# Patient Record
Sex: MALE | Race: WHITE | NOT HISPANIC OR LATINO | URBAN - METROPOLITAN AREA
[De-identification: names, ages, dates, MRNs, and addresses within clinical notes are randomized per-mention and may not be internally consistent; named-entity substitution may affect disease eponyms.]

---

## 2017-01-30 ENCOUNTER — OFFICE VISIT - HEALTHEAST (OUTPATIENT)
Dept: GERIATRICS | Facility: CLINIC | Age: 82
End: 2017-01-30

## 2017-01-30 DIAGNOSIS — I25.10 CORONARY ATHEROSCLEROSIS: ICD-10-CM

## 2017-01-30 DIAGNOSIS — I10 ESSENTIAL HYPERTENSION WITH GOAL BLOOD PRESSURE LESS THAN 140/90: ICD-10-CM

## 2017-01-30 DIAGNOSIS — F41.8 DEPRESSION WITH ANXIETY: ICD-10-CM

## 2017-01-30 DIAGNOSIS — K21.9 GASTROESOPHAGEAL REFLUX DISEASE WITHOUT ESOPHAGITIS: ICD-10-CM

## 2017-01-30 DIAGNOSIS — F02.80 DEMENTIA OF THE ALZHEIMER'S TYPE (H): ICD-10-CM

## 2017-01-30 DIAGNOSIS — R60.0 BILATERAL LOWER EXTREMITY EDEMA: ICD-10-CM

## 2017-01-30 DIAGNOSIS — G30.9 DEMENTIA OF THE ALZHEIMER'S TYPE (H): ICD-10-CM

## 2017-02-02 ENCOUNTER — AMBULATORY - HEALTHEAST (OUTPATIENT)
Dept: GERIATRICS | Facility: CLINIC | Age: 82
End: 2017-02-02

## 2017-02-23 ENCOUNTER — OFFICE VISIT - HEALTHEAST (OUTPATIENT)
Dept: GERIATRICS | Facility: CLINIC | Age: 82
End: 2017-02-23

## 2017-02-23 DIAGNOSIS — F06.30 MOOD DISORDER IN CONDITIONS CLASSIFIED ELSEWHERE: ICD-10-CM

## 2017-02-23 DIAGNOSIS — I10 ESSENTIAL HYPERTENSION WITH GOAL BLOOD PRESSURE LESS THAN 140/90: ICD-10-CM

## 2017-02-23 DIAGNOSIS — F41.8 DEPRESSION WITH ANXIETY: ICD-10-CM

## 2017-02-23 DIAGNOSIS — F03.918 DEMENTIA WITH BEHAVIORAL DISTURBANCE (H): ICD-10-CM

## 2017-03-04 RX ORDER — DEXTROMETHORPHAN HBR. AND GUAIFENESIN 10; 100 MG/5ML; MG/5ML
10 SOLUTION ORAL EVERY 4 HOURS
Status: SHIPPED | COMMUNITY
Start: 2017-03-04

## 2017-03-04 RX ORDER — MENTHOL AND ZINC OXIDE .44; 20.625 G/100G; G/100G
1 OINTMENT TOPICAL 3 TIMES DAILY PRN
Status: SHIPPED | COMMUNITY
Start: 2017-03-04

## 2017-03-04 RX ORDER — MENTHOL AND ZINC OXIDE .44; 20.625 G/100G; G/100G
1 OINTMENT TOPICAL 3 TIMES DAILY
Status: SHIPPED | COMMUNITY
Start: 2017-03-04

## 2017-03-13 ENCOUNTER — OFFICE VISIT - HEALTHEAST (OUTPATIENT)
Dept: GERIATRICS | Facility: CLINIC | Age: 82
End: 2017-03-13

## 2017-03-13 DIAGNOSIS — F41.8 DEPRESSION WITH ANXIETY: ICD-10-CM

## 2017-03-13 DIAGNOSIS — M62.81 MUSCLE WEAKNESS (GENERALIZED): ICD-10-CM

## 2017-03-13 DIAGNOSIS — E88.09 HYPOALBUMINEMIA DUE TO PROTEIN-CALORIE MALNUTRITION (H): ICD-10-CM

## 2017-03-13 DIAGNOSIS — E46 HYPOALBUMINEMIA DUE TO PROTEIN-CALORIE MALNUTRITION (H): ICD-10-CM

## 2017-03-13 DIAGNOSIS — R21 RASH: ICD-10-CM

## 2017-03-13 DIAGNOSIS — G30.9 DEMENTIA OF THE ALZHEIMER'S TYPE (H): ICD-10-CM

## 2017-03-13 DIAGNOSIS — T14.8XXA DEEP TISSUE INJURY: ICD-10-CM

## 2017-03-13 DIAGNOSIS — R29.6 FREQUENT FALLS: ICD-10-CM

## 2017-03-13 DIAGNOSIS — F02.80 DEMENTIA OF THE ALZHEIMER'S TYPE (H): ICD-10-CM

## 2017-03-17 ENCOUNTER — COMMUNICATION - HEALTHEAST (OUTPATIENT)
Dept: INTERNAL MEDICINE | Facility: CLINIC | Age: 82
End: 2017-03-17

## 2017-03-17 DIAGNOSIS — G30.9 DEMENTIA OF THE ALZHEIMER'S TYPE (H): ICD-10-CM

## 2017-03-17 DIAGNOSIS — F02.80 DEMENTIA OF THE ALZHEIMER'S TYPE (H): ICD-10-CM

## 2017-03-19 ENCOUNTER — AMBULATORY - HEALTHEAST (OUTPATIENT)
Dept: GERIATRICS | Facility: CLINIC | Age: 82
End: 2017-03-19

## 2017-03-20 ENCOUNTER — COMMUNICATION - HEALTHEAST (OUTPATIENT)
Dept: INTERNAL MEDICINE | Facility: CLINIC | Age: 82
End: 2017-03-20

## 2017-03-20 DIAGNOSIS — F02.80 DEMENTIA IN ALZHEIMER'S DISEASE (H): ICD-10-CM

## 2017-03-20 DIAGNOSIS — F02.80 DEMENTIA OF THE ALZHEIMER'S TYPE (H): ICD-10-CM

## 2017-03-20 DIAGNOSIS — G30.9 DEMENTIA OF THE ALZHEIMER'S TYPE (H): ICD-10-CM

## 2017-03-20 DIAGNOSIS — G30.9 DEMENTIA IN ALZHEIMER'S DISEASE (H): ICD-10-CM

## 2017-03-24 ENCOUNTER — COMMUNICATION - HEALTHEAST (OUTPATIENT)
Dept: INTERNAL MEDICINE | Facility: CLINIC | Age: 82
End: 2017-03-24

## 2017-03-24 DIAGNOSIS — G30.9 DEMENTIA IN ALZHEIMER'S DISEASE (H): ICD-10-CM

## 2017-03-24 DIAGNOSIS — F02.80 DEMENTIA IN ALZHEIMER'S DISEASE (H): ICD-10-CM

## 2017-04-20 ENCOUNTER — OFFICE VISIT - HEALTHEAST (OUTPATIENT)
Dept: GERIATRICS | Facility: CLINIC | Age: 82
End: 2017-04-20

## 2017-04-20 DIAGNOSIS — E88.09 HYPOALBUMINEMIA DUE TO PROTEIN-CALORIE MALNUTRITION (H): ICD-10-CM

## 2017-04-20 DIAGNOSIS — G30.9 DEMENTIA OF THE ALZHEIMER'S TYPE (H): ICD-10-CM

## 2017-04-20 DIAGNOSIS — F02.80 DEMENTIA OF THE ALZHEIMER'S TYPE (H): ICD-10-CM

## 2017-04-20 DIAGNOSIS — F41.8 DEPRESSION WITH ANXIETY: ICD-10-CM

## 2017-04-20 DIAGNOSIS — I25.10 CORONARY ATHEROSCLEROSIS: ICD-10-CM

## 2017-04-20 DIAGNOSIS — F06.30 MOOD DISORDER IN CONDITIONS CLASSIFIED ELSEWHERE: ICD-10-CM

## 2017-04-20 DIAGNOSIS — E46 HYPOALBUMINEMIA DUE TO PROTEIN-CALORIE MALNUTRITION (H): ICD-10-CM

## 2017-04-20 DIAGNOSIS — J11.1 INFLUENZA: ICD-10-CM

## 2017-04-20 DIAGNOSIS — I10 ESSENTIAL HYPERTENSION WITH GOAL BLOOD PRESSURE LESS THAN 140/90: ICD-10-CM

## 2017-05-11 ENCOUNTER — OFFICE VISIT - HEALTHEAST (OUTPATIENT)
Dept: GERIATRICS | Facility: CLINIC | Age: 82
End: 2017-05-11

## 2017-05-11 DIAGNOSIS — R21 RASH: ICD-10-CM

## 2017-05-11 DIAGNOSIS — F41.8 DEPRESSION WITH ANXIETY: ICD-10-CM

## 2017-05-11 DIAGNOSIS — M62.81 MUSCLE WEAKNESS (GENERALIZED): ICD-10-CM

## 2017-05-11 DIAGNOSIS — G30.9 DEMENTIA OF THE ALZHEIMER'S TYPE (H): ICD-10-CM

## 2017-05-11 DIAGNOSIS — I10 ESSENTIAL HYPERTENSION WITH GOAL BLOOD PRESSURE LESS THAN 140/90: ICD-10-CM

## 2017-05-11 DIAGNOSIS — F02.80 DEMENTIA OF THE ALZHEIMER'S TYPE (H): ICD-10-CM

## 2017-06-15 ENCOUNTER — OFFICE VISIT - HEALTHEAST (OUTPATIENT)
Dept: GERIATRICS | Facility: CLINIC | Age: 82
End: 2017-06-15

## 2017-06-15 DIAGNOSIS — K21.9 GASTROESOPHAGEAL REFLUX DISEASE WITHOUT ESOPHAGITIS: ICD-10-CM

## 2017-06-15 DIAGNOSIS — R60.0 BILATERAL LOWER EXTREMITY EDEMA: ICD-10-CM

## 2017-06-15 DIAGNOSIS — E78.5 HYPERLIPIDEMIA: ICD-10-CM

## 2017-06-15 DIAGNOSIS — R29.6 FREQUENT FALLS: ICD-10-CM

## 2017-06-15 DIAGNOSIS — I10 ESSENTIAL HYPERTENSION WITH GOAL BLOOD PRESSURE LESS THAN 130/85: ICD-10-CM

## 2017-06-15 DIAGNOSIS — M62.81 MUSCLE WEAKNESS (GENERALIZED): ICD-10-CM

## 2017-07-14 ENCOUNTER — OFFICE VISIT - HEALTHEAST (OUTPATIENT)
Dept: GERIATRICS | Facility: CLINIC | Age: 82
End: 2017-07-14

## 2017-07-14 DIAGNOSIS — M62.81 MUSCLE WEAKNESS (GENERALIZED): ICD-10-CM

## 2017-07-14 DIAGNOSIS — R60.0 BILATERAL LOWER EXTREMITY EDEMA: ICD-10-CM

## 2017-07-14 DIAGNOSIS — I10 ESSENTIAL HYPERTENSION WITH GOAL BLOOD PRESSURE LESS THAN 130/85: ICD-10-CM

## 2017-07-14 DIAGNOSIS — I25.10 CORONARY ATHEROSCLEROSIS: ICD-10-CM

## 2017-07-14 DIAGNOSIS — G30.9 DEMENTIA OF THE ALZHEIMER'S TYPE (H): ICD-10-CM

## 2017-07-14 DIAGNOSIS — F02.80 DEMENTIA OF THE ALZHEIMER'S TYPE (H): ICD-10-CM

## 2017-07-14 DIAGNOSIS — K21.9 GASTROESOPHAGEAL REFLUX DISEASE WITHOUT ESOPHAGITIS: ICD-10-CM

## 2017-07-14 DIAGNOSIS — F41.8 DEPRESSION WITH ANXIETY: ICD-10-CM

## 2017-08-24 ENCOUNTER — OFFICE VISIT - HEALTHEAST (OUTPATIENT)
Dept: GERIATRICS | Facility: CLINIC | Age: 82
End: 2017-08-24

## 2017-08-24 DIAGNOSIS — E78.5 HYPERLIPIDEMIA: ICD-10-CM

## 2017-08-24 DIAGNOSIS — I25.10 CORONARY ATHEROSCLEROSIS: ICD-10-CM

## 2017-08-24 DIAGNOSIS — F03.918 DEMENTIA WITH BEHAVIORAL DISTURBANCE (H): ICD-10-CM

## 2017-08-24 DIAGNOSIS — I10 ESSENTIAL HYPERTENSION WITH GOAL BLOOD PRESSURE LESS THAN 130/85: ICD-10-CM

## 2017-08-24 DIAGNOSIS — R60.0 BILATERAL LOWER EXTREMITY EDEMA: ICD-10-CM

## 2017-09-28 ENCOUNTER — OFFICE VISIT - HEALTHEAST (OUTPATIENT)
Dept: GERIATRICS | Facility: CLINIC | Age: 82
End: 2017-09-28

## 2017-09-28 DIAGNOSIS — I25.10 CORONARY ATHEROSCLEROSIS: ICD-10-CM

## 2017-09-28 DIAGNOSIS — F03.918 DEMENTIA WITH BEHAVIORAL DISTURBANCE (H): ICD-10-CM

## 2017-09-28 DIAGNOSIS — F41.8 DEPRESSION WITH ANXIETY: ICD-10-CM

## 2017-09-28 DIAGNOSIS — R60.0 BILATERAL LOWER EXTREMITY EDEMA: ICD-10-CM

## 2017-09-28 DIAGNOSIS — M62.81 MUSCLE WEAKNESS (GENERALIZED): ICD-10-CM

## 2017-10-31 ENCOUNTER — OFFICE VISIT - HEALTHEAST (OUTPATIENT)
Dept: GERIATRICS | Facility: CLINIC | Age: 82
End: 2017-10-31

## 2017-10-31 DIAGNOSIS — I25.10 CORONARY ATHEROSCLEROSIS: ICD-10-CM

## 2017-10-31 DIAGNOSIS — F02.80 DEMENTIA OF THE ALZHEIMER'S TYPE (H): ICD-10-CM

## 2017-10-31 DIAGNOSIS — F41.8 DEPRESSION WITH ANXIETY: ICD-10-CM

## 2017-10-31 DIAGNOSIS — I10 ESSENTIAL HYPERTENSION WITH GOAL BLOOD PRESSURE LESS THAN 130/85: ICD-10-CM

## 2017-10-31 DIAGNOSIS — G30.9 DEMENTIA OF THE ALZHEIMER'S TYPE (H): ICD-10-CM

## 2017-11-04 ENCOUNTER — COMMUNICATION - HEALTHEAST (OUTPATIENT)
Dept: INTERNAL MEDICINE | Facility: CLINIC | Age: 82
End: 2017-11-04

## 2017-11-04 DIAGNOSIS — F32.9 MAJOR DEPRESSION, SINGLE EPISODE: ICD-10-CM

## 2017-11-13 ENCOUNTER — OFFICE VISIT - HEALTHEAST (OUTPATIENT)
Dept: GERIATRICS | Facility: CLINIC | Age: 82
End: 2017-11-13

## 2017-11-13 DIAGNOSIS — M62.81 MUSCLE WEAKNESS (GENERALIZED): ICD-10-CM

## 2017-11-13 DIAGNOSIS — R60.0 BILATERAL LOWER EXTREMITY EDEMA: ICD-10-CM

## 2017-11-13 DIAGNOSIS — E78.5 HYPERLIPIDEMIA: ICD-10-CM

## 2017-11-13 DIAGNOSIS — K21.9 GASTROESOPHAGEAL REFLUX DISEASE WITHOUT ESOPHAGITIS: ICD-10-CM

## 2017-11-13 DIAGNOSIS — G30.9 DEMENTIA OF THE ALZHEIMER'S TYPE (H): ICD-10-CM

## 2017-11-13 DIAGNOSIS — F02.80 DEMENTIA OF THE ALZHEIMER'S TYPE (H): ICD-10-CM

## 2017-12-18 ENCOUNTER — OFFICE VISIT - HEALTHEAST (OUTPATIENT)
Dept: GERIATRICS | Facility: CLINIC | Age: 82
End: 2017-12-18

## 2017-12-18 DIAGNOSIS — E78.5 HYPERLIPIDEMIA: ICD-10-CM

## 2017-12-18 DIAGNOSIS — F41.8 DEPRESSION WITH ANXIETY: ICD-10-CM

## 2017-12-18 DIAGNOSIS — G30.9 DEMENTIA OF THE ALZHEIMER'S TYPE (H): ICD-10-CM

## 2017-12-18 DIAGNOSIS — I25.10 CORONARY ATHEROSCLEROSIS: ICD-10-CM

## 2017-12-18 DIAGNOSIS — F02.80 DEMENTIA OF THE ALZHEIMER'S TYPE (H): ICD-10-CM

## 2017-12-18 DIAGNOSIS — M62.81 MUSCLE WEAKNESS (GENERALIZED): ICD-10-CM

## 2017-12-18 DIAGNOSIS — R60.0 BILATERAL LOWER EXTREMITY EDEMA: ICD-10-CM

## 2017-12-21 ENCOUNTER — OFFICE VISIT - HEALTHEAST (OUTPATIENT)
Dept: GERIATRICS | Facility: CLINIC | Age: 82
End: 2017-12-21

## 2017-12-21 DIAGNOSIS — J06.9 UPPER RESPIRATORY TRACT INFECTION, UNSPECIFIED TYPE: ICD-10-CM

## 2017-12-21 DIAGNOSIS — F02.80 ALZHEIMER'S DEMENTIA WITHOUT BEHAVIORAL DISTURBANCE, UNSPECIFIED TIMING OF DEMENTIA ONSET: ICD-10-CM

## 2017-12-21 DIAGNOSIS — M62.81 MUSCLE WEAKNESS (GENERALIZED): ICD-10-CM

## 2017-12-21 DIAGNOSIS — G30.9 ALZHEIMER'S DEMENTIA WITHOUT BEHAVIORAL DISTURBANCE, UNSPECIFIED TIMING OF DEMENTIA ONSET: ICD-10-CM

## 2018-01-29 ENCOUNTER — OFFICE VISIT - HEALTHEAST (OUTPATIENT)
Dept: GERIATRICS | Facility: CLINIC | Age: 83
End: 2018-01-29

## 2018-01-29 DIAGNOSIS — R13.10 DYSPHAGIA: ICD-10-CM

## 2018-01-29 DIAGNOSIS — F41.8 DEPRESSION WITH ANXIETY: ICD-10-CM

## 2018-01-29 DIAGNOSIS — F02.80 ALZHEIMER'S DEMENTIA WITHOUT BEHAVIORAL DISTURBANCE, UNSPECIFIED TIMING OF DEMENTIA ONSET: ICD-10-CM

## 2018-01-29 DIAGNOSIS — I25.10 CORONARY ATHEROSCLEROSIS: ICD-10-CM

## 2018-01-29 DIAGNOSIS — G30.9 ALZHEIMER'S DEMENTIA WITHOUT BEHAVIORAL DISTURBANCE, UNSPECIFIED TIMING OF DEMENTIA ONSET: ICD-10-CM

## 2018-01-29 DIAGNOSIS — M62.81 MUSCLE WEAKNESS (GENERALIZED): ICD-10-CM

## 2018-02-07 ENCOUNTER — COMMUNICATION - HEALTHEAST (OUTPATIENT)
Dept: INTERNAL MEDICINE | Facility: CLINIC | Age: 83
End: 2018-02-07

## 2018-02-07 DIAGNOSIS — F32.9 MAJOR DEPRESSION, SINGLE EPISODE: ICD-10-CM

## 2018-02-21 ENCOUNTER — OFFICE VISIT - HEALTHEAST (OUTPATIENT)
Dept: GERIATRICS | Facility: CLINIC | Age: 83
End: 2018-02-21

## 2018-02-21 DIAGNOSIS — I10 ESSENTIAL HYPERTENSION WITH GOAL BLOOD PRESSURE LESS THAN 130/85: ICD-10-CM

## 2018-02-21 DIAGNOSIS — L89.159 DECUBITUS ULCER OF COCCYX, UNSPECIFIED PRESSURE ULCER STAGE: ICD-10-CM

## 2018-02-21 DIAGNOSIS — F03.918 DEMENTIA WITH BEHAVIORAL DISTURBANCE (H): ICD-10-CM

## 2018-02-21 DIAGNOSIS — R60.0 BILATERAL LOWER EXTREMITY EDEMA: ICD-10-CM

## 2018-02-21 DIAGNOSIS — M62.81 MUSCLE WEAKNESS (GENERALIZED): ICD-10-CM

## 2018-03-08 ENCOUNTER — OFFICE VISIT - HEALTHEAST (OUTPATIENT)
Dept: GERIATRICS | Facility: CLINIC | Age: 83
End: 2018-03-08

## 2018-03-08 DIAGNOSIS — F03.918 DEMENTIA WITH BEHAVIORAL DISTURBANCE (H): ICD-10-CM

## 2018-04-24 ENCOUNTER — OFFICE VISIT - HEALTHEAST (OUTPATIENT)
Dept: GERIATRICS | Facility: CLINIC | Age: 83
End: 2018-04-24

## 2018-04-24 DIAGNOSIS — M62.81 MUSCLE WEAKNESS (GENERALIZED): ICD-10-CM

## 2018-04-24 DIAGNOSIS — I25.10 CORONARY ATHEROSCLEROSIS: ICD-10-CM

## 2018-04-24 DIAGNOSIS — I10 ESSENTIAL HYPERTENSION WITH GOAL BLOOD PRESSURE LESS THAN 140/90: ICD-10-CM

## 2018-04-24 DIAGNOSIS — F03.918 DEMENTIA WITH BEHAVIORAL DISTURBANCE (H): ICD-10-CM

## 2018-04-24 DIAGNOSIS — F41.8 DEPRESSION WITH ANXIETY: ICD-10-CM

## 2018-05-03 ENCOUNTER — OFFICE VISIT - HEALTHEAST (OUTPATIENT)
Dept: GERIATRICS | Facility: CLINIC | Age: 83
End: 2018-05-03

## 2018-05-03 DIAGNOSIS — G30.1 LATE ONSET ALZHEIMER'S DISEASE WITH BEHAVIORAL DISTURBANCE (H): ICD-10-CM

## 2018-05-03 DIAGNOSIS — L03.90 CELLULITIS: ICD-10-CM

## 2018-05-03 DIAGNOSIS — F02.818 LATE ONSET ALZHEIMER'S DISEASE WITH BEHAVIORAL DISTURBANCE (H): ICD-10-CM

## 2018-05-04 ENCOUNTER — COMMUNICATION - HEALTHEAST (OUTPATIENT)
Dept: GERIATRICS | Facility: CLINIC | Age: 83
End: 2018-05-04

## 2018-05-04 ENCOUNTER — AMBULATORY - HEALTHEAST (OUTPATIENT)
Dept: GERIATRICS | Facility: CLINIC | Age: 83
End: 2018-05-04

## 2018-05-04 DIAGNOSIS — F03.918 DEMENTIA WITH BEHAVIORAL DISTURBANCE (H): ICD-10-CM

## 2018-05-04 RX ORDER — RISPERIDONE 0.25 MG/1
0.25 TABLET ORAL 2 TIMES DAILY
Status: SHIPPED | COMMUNITY
Start: 2018-05-04

## 2018-05-07 ENCOUNTER — COMMUNICATION - HEALTHEAST (OUTPATIENT)
Dept: GERIATRICS | Facility: CLINIC | Age: 83
End: 2018-05-07

## 2018-05-07 DIAGNOSIS — F32.A DEPRESSION: ICD-10-CM

## 2018-05-07 RX ORDER — MIRTAZAPINE 15 MG/1
15 TABLET, FILM COATED ORAL AT BEDTIME
Qty: 90 TABLET | Refills: 2 | Status: SHIPPED | OUTPATIENT
Start: 2018-05-07

## 2018-05-10 ENCOUNTER — OFFICE VISIT - HEALTHEAST (OUTPATIENT)
Dept: GERIATRICS | Facility: CLINIC | Age: 83
End: 2018-05-10

## 2018-05-10 DIAGNOSIS — F02.818 LATE ONSET ALZHEIMER'S DISEASE WITH BEHAVIORAL DISTURBANCE (H): ICD-10-CM

## 2018-05-10 DIAGNOSIS — L03.115 CELLULITIS OF RIGHT LOWER EXTREMITY: ICD-10-CM

## 2018-05-10 DIAGNOSIS — G30.1 LATE ONSET ALZHEIMER'S DISEASE WITH BEHAVIORAL DISTURBANCE (H): ICD-10-CM

## 2018-05-29 ENCOUNTER — OFFICE VISIT - HEALTHEAST (OUTPATIENT)
Dept: GERIATRICS | Facility: CLINIC | Age: 83
End: 2018-05-29

## 2018-05-29 DIAGNOSIS — I25.10 CORONARY ATHEROSCLEROSIS: ICD-10-CM

## 2018-05-29 DIAGNOSIS — F41.8 DEPRESSION WITH ANXIETY: ICD-10-CM

## 2018-05-29 DIAGNOSIS — F03.918 DEMENTIA WITH BEHAVIORAL DISTURBANCE (H): ICD-10-CM

## 2018-05-29 DIAGNOSIS — M62.81 MUSCLE WEAKNESS (GENERALIZED): ICD-10-CM

## 2018-05-29 DIAGNOSIS — R53.83 FATIGUE: ICD-10-CM

## 2018-06-26 ENCOUNTER — OFFICE VISIT - HEALTHEAST (OUTPATIENT)
Dept: GERIATRICS | Facility: CLINIC | Age: 83
End: 2018-06-26

## 2018-06-26 DIAGNOSIS — G30.9 ALZHEIMER'S DEMENTIA WITHOUT BEHAVIORAL DISTURBANCE, UNSPECIFIED TIMING OF DEMENTIA ONSET: ICD-10-CM

## 2018-06-26 DIAGNOSIS — F02.80 ALZHEIMER'S DEMENTIA WITHOUT BEHAVIORAL DISTURBANCE, UNSPECIFIED TIMING OF DEMENTIA ONSET: ICD-10-CM

## 2018-06-26 DIAGNOSIS — M62.81 MUSCLE WEAKNESS (GENERALIZED): ICD-10-CM

## 2018-06-26 DIAGNOSIS — F41.8 DEPRESSION WITH ANXIETY: ICD-10-CM

## 2018-06-26 DIAGNOSIS — I10 ESSENTIAL HYPERTENSION WITH GOAL BLOOD PRESSURE LESS THAN 140/90: ICD-10-CM

## 2018-06-26 DIAGNOSIS — L89.612 DECUBITUS ULCER OF RIGHT HEEL, STAGE 2 (H): ICD-10-CM

## 2018-06-28 ENCOUNTER — OFFICE VISIT - HEALTHEAST (OUTPATIENT)
Dept: GERIATRICS | Facility: CLINIC | Age: 83
End: 2018-06-28

## 2018-06-28 DIAGNOSIS — F03.91 DEMENTIA WITH BEHAVIORAL DISTURBANCE, UNSPECIFIED DEMENTIA TYPE: ICD-10-CM

## 2018-06-28 RX ORDER — NUT.TX.IMPAIRED DIGEST/FIBER 14.8 G-472
1 POWDER (GRAM) ORAL 2 TIMES DAILY
Status: SHIPPED | COMMUNITY
Start: 2018-06-28

## 2018-06-29 ENCOUNTER — AMBULATORY - HEALTHEAST (OUTPATIENT)
Dept: GERIATRICS | Facility: CLINIC | Age: 83
End: 2018-06-29

## 2018-06-29 RX ORDER — FUROSEMIDE 40 MG
40 TABLET ORAL DAILY
Status: SHIPPED | COMMUNITY
Start: 2018-06-29

## 2018-07-02 ENCOUNTER — RECORDS - HEALTHEAST (OUTPATIENT)
Dept: LAB | Facility: CLINIC | Age: 83
End: 2018-07-02

## 2018-07-02 ENCOUNTER — OFFICE VISIT - HEALTHEAST (OUTPATIENT)
Dept: GERIATRICS | Facility: CLINIC | Age: 83
End: 2018-07-02

## 2018-07-02 DIAGNOSIS — F03.91 DEMENTIA WITH BEHAVIORAL DISTURBANCE, UNSPECIFIED DEMENTIA TYPE: ICD-10-CM

## 2018-07-02 DIAGNOSIS — F41.8 DEPRESSION WITH ANXIETY: ICD-10-CM

## 2018-07-02 DIAGNOSIS — R09.02 HYPOXEMIA: ICD-10-CM

## 2018-07-02 DIAGNOSIS — I10 ESSENTIAL HYPERTENSION WITH GOAL BLOOD PRESSURE LESS THAN 140/90: ICD-10-CM

## 2018-07-02 LAB
ALBUMIN SERPL-MCNC: 3.1 G/DL (ref 3.5–5)
ANION GAP SERPL CALCULATED.3IONS-SCNC: 5 MMOL/L (ref 5–18)
BNP SERPL-MCNC: 64 PG/ML (ref 0–93)
BUN SERPL-MCNC: 16 MG/DL (ref 8–28)
CALCIUM SERPL-MCNC: 9.2 MG/DL (ref 8.5–10.5)
CHLORIDE BLD-SCNC: 102 MMOL/L (ref 98–107)
CO2 SERPL-SCNC: 30 MMOL/L (ref 22–31)
CREAT SERPL-MCNC: 0.8 MG/DL (ref 0.7–1.3)
ERYTHROCYTE [DISTWIDTH] IN BLOOD BY AUTOMATED COUNT: 15.1 % (ref 11–14.5)
GFR SERPL CREATININE-BSD FRML MDRD: >60 ML/MIN/1.73M2
GLUCOSE BLD-MCNC: 94 MG/DL (ref 70–125)
HCT VFR BLD AUTO: 44.1 % (ref 40–54)
HGB BLD-MCNC: 14.9 G/DL (ref 14–18)
MCH RBC QN AUTO: 29 PG (ref 27–34)
MCHC RBC AUTO-ENTMCNC: 33.8 G/DL (ref 32–36)
MCV RBC AUTO: 86 FL (ref 80–100)
PLATELET # BLD AUTO: 276 THOU/UL (ref 140–440)
PMV BLD AUTO: 11.6 FL (ref 8.5–12.5)
POTASSIUM BLD-SCNC: 4.7 MMOL/L (ref 3.5–5)
RBC # BLD AUTO: 5.13 MILL/UL (ref 4.4–6.2)
SODIUM SERPL-SCNC: 137 MMOL/L (ref 136–145)
TSH SERPL DL<=0.005 MIU/L-ACNC: 2.38 UIU/ML (ref 0.3–5)
VIT B12 SERPL-MCNC: 394 PG/ML (ref 213–816)
WBC: 8.9 THOU/UL (ref 4–11)

## 2018-07-10 ENCOUNTER — OFFICE VISIT - HEALTHEAST (OUTPATIENT)
Dept: GERIATRICS | Facility: CLINIC | Age: 83
End: 2018-07-10

## 2018-07-10 DIAGNOSIS — F41.8 DEPRESSION WITH ANXIETY: ICD-10-CM

## 2018-07-10 DIAGNOSIS — R60.0 BILATERAL LOWER EXTREMITY EDEMA: ICD-10-CM

## 2018-07-10 DIAGNOSIS — F03.91 DEMENTIA WITH BEHAVIORAL DISTURBANCE, UNSPECIFIED DEMENTIA TYPE: ICD-10-CM

## 2018-07-10 DIAGNOSIS — I25.10 CORONARY ATHEROSCLEROSIS: ICD-10-CM

## 2018-07-12 ENCOUNTER — OFFICE VISIT - HEALTHEAST (OUTPATIENT)
Dept: GERIATRICS | Facility: CLINIC | Age: 83
End: 2018-07-12

## 2018-07-12 DIAGNOSIS — F03.91 DEMENTIA WITH BEHAVIORAL DISTURBANCE, UNSPECIFIED DEMENTIA TYPE: ICD-10-CM

## 2018-07-12 DIAGNOSIS — R09.02 HYPOXEMIA: ICD-10-CM

## 2018-07-13 ENCOUNTER — RECORDS - HEALTHEAST (OUTPATIENT)
Dept: LAB | Facility: CLINIC | Age: 83
End: 2018-07-13

## 2018-07-13 LAB
ANION GAP SERPL CALCULATED.3IONS-SCNC: 8 MMOL/L (ref 5–18)
BASOPHILS # BLD AUTO: 0.1 THOU/UL (ref 0–0.2)
BASOPHILS NFR BLD AUTO: 1 % (ref 0–2)
BUN SERPL-MCNC: 18 MG/DL (ref 8–28)
CALCIUM SERPL-MCNC: 8.9 MG/DL (ref 8.5–10.5)
CHLORIDE BLD-SCNC: 100 MMOL/L (ref 98–107)
CO2 SERPL-SCNC: 30 MMOL/L (ref 22–31)
CREAT SERPL-MCNC: 0.83 MG/DL (ref 0.7–1.3)
EOSINOPHIL # BLD AUTO: 0.9 THOU/UL (ref 0–0.4)
EOSINOPHIL NFR BLD AUTO: 9 % (ref 0–6)
ERYTHROCYTE [DISTWIDTH] IN BLOOD BY AUTOMATED COUNT: 15.1 % (ref 11–14.5)
GFR SERPL CREATININE-BSD FRML MDRD: >60 ML/MIN/1.73M2
GLUCOSE BLD-MCNC: 98 MG/DL (ref 70–125)
HCT VFR BLD AUTO: 43.3 % (ref 40–54)
HGB BLD-MCNC: 14.1 G/DL (ref 14–18)
LYMPHOCYTES # BLD AUTO: 1.8 THOU/UL (ref 0.8–4.4)
LYMPHOCYTES NFR BLD AUTO: 20 % (ref 20–40)
MCH RBC QN AUTO: 29.3 PG (ref 27–34)
MCHC RBC AUTO-ENTMCNC: 32.6 G/DL (ref 32–36)
MCV RBC AUTO: 90 FL (ref 80–100)
MONOCYTES # BLD AUTO: 0.7 THOU/UL (ref 0–0.9)
MONOCYTES NFR BLD AUTO: 7 % (ref 2–10)
NEUTROPHILS # BLD AUTO: 5.8 THOU/UL (ref 2–7.7)
NEUTROPHILS NFR BLD AUTO: 63 % (ref 50–70)
PLATELET # BLD AUTO: 249 THOU/UL (ref 140–440)
PMV BLD AUTO: 11.6 FL (ref 8.5–12.5)
POTASSIUM BLD-SCNC: 4 MMOL/L (ref 3.5–5)
RBC # BLD AUTO: 4.82 MILL/UL (ref 4.4–6.2)
SODIUM SERPL-SCNC: 138 MMOL/L (ref 136–145)
WBC: 9.3 THOU/UL (ref 4–11)

## 2018-08-01 ENCOUNTER — COMMUNICATION - HEALTHEAST (OUTPATIENT)
Dept: GERIATRICS | Facility: CLINIC | Age: 83
End: 2018-08-01

## 2018-08-01 DIAGNOSIS — G30.9 ALZHEIMER'S DEMENTIA (H): ICD-10-CM

## 2018-08-01 DIAGNOSIS — F02.80 ALZHEIMER'S DEMENTIA (H): ICD-10-CM

## 2018-08-01 RX ORDER — RISPERIDONE 0.5 MG/1
0.5 TABLET ORAL 2 TIMES DAILY
Qty: 180 TABLET | Refills: 2 | Status: SHIPPED | OUTPATIENT
Start: 2018-08-01

## 2018-08-30 ENCOUNTER — OFFICE VISIT - HEALTHEAST (OUTPATIENT)
Dept: GERIATRICS | Facility: CLINIC | Age: 83
End: 2018-08-30

## 2018-08-30 DIAGNOSIS — M62.81 MUSCLE WEAKNESS (GENERALIZED): ICD-10-CM

## 2018-08-30 DIAGNOSIS — I10 ESSENTIAL HYPERTENSION WITH GOAL BLOOD PRESSURE LESS THAN 140/90: ICD-10-CM

## 2018-08-30 DIAGNOSIS — G30.9 ALZHEIMER'S DEMENTIA WITHOUT BEHAVIORAL DISTURBANCE, UNSPECIFIED TIMING OF DEMENTIA ONSET: ICD-10-CM

## 2018-08-30 DIAGNOSIS — J18.9 PNEUMONIA: ICD-10-CM

## 2018-08-30 DIAGNOSIS — I25.10 CORONARY ATHEROSCLEROSIS: ICD-10-CM

## 2018-08-30 DIAGNOSIS — F02.80 ALZHEIMER'S DEMENTIA WITHOUT BEHAVIORAL DISTURBANCE, UNSPECIFIED TIMING OF DEMENTIA ONSET: ICD-10-CM

## 2018-09-11 ENCOUNTER — OFFICE VISIT - HEALTHEAST (OUTPATIENT)
Dept: GERIATRICS | Facility: CLINIC | Age: 83
End: 2018-09-11

## 2018-09-11 DIAGNOSIS — F02.80 ALZHEIMER'S DEMENTIA WITHOUT BEHAVIORAL DISTURBANCE, UNSPECIFIED TIMING OF DEMENTIA ONSET: ICD-10-CM

## 2018-09-11 DIAGNOSIS — I25.10 CORONARY ATHEROSCLEROSIS: ICD-10-CM

## 2018-09-11 DIAGNOSIS — G30.9 ALZHEIMER'S DEMENTIA WITHOUT BEHAVIORAL DISTURBANCE, UNSPECIFIED TIMING OF DEMENTIA ONSET: ICD-10-CM

## 2018-09-11 DIAGNOSIS — R60.0 BILATERAL LOWER EXTREMITY EDEMA: ICD-10-CM

## 2018-09-11 DIAGNOSIS — M62.81 MUSCLE WEAKNESS (GENERALIZED): ICD-10-CM

## 2018-09-11 DIAGNOSIS — F41.8 DEPRESSION WITH ANXIETY: ICD-10-CM

## 2018-09-17 ASSESSMENT — MIFFLIN-ST. JEOR: SCORE: 1672.72

## 2018-09-20 ENCOUNTER — OFFICE VISIT - HEALTHEAST (OUTPATIENT)
Dept: GERIATRICS | Facility: CLINIC | Age: 83
End: 2018-09-20

## 2018-09-20 DIAGNOSIS — G47.30 SLEEP APNEA, UNSPECIFIED TYPE: ICD-10-CM

## 2018-09-20 DIAGNOSIS — R09.02 HYPOXEMIA: ICD-10-CM

## 2018-09-20 DIAGNOSIS — M62.81 MUSCLE WEAKNESS (GENERALIZED): ICD-10-CM

## 2018-09-20 DIAGNOSIS — G30.1 LATE ONSET ALZHEIMER'S DISEASE WITH BEHAVIORAL DISTURBANCE (H): ICD-10-CM

## 2018-09-20 DIAGNOSIS — J96.11 CHRONIC RESPIRATORY FAILURE WITH HYPOXIA (H): ICD-10-CM

## 2018-09-20 DIAGNOSIS — F06.30 MOOD DISORDER IN CONDITIONS CLASSIFIED ELSEWHERE: ICD-10-CM

## 2018-09-20 DIAGNOSIS — F02.818 LATE ONSET ALZHEIMER'S DISEASE WITH BEHAVIORAL DISTURBANCE (H): ICD-10-CM

## 2018-09-20 DIAGNOSIS — I25.10 ATHEROSCLEROSIS OF CORONARY ARTERY OF NATIVE HEART, ANGINA PRESENCE UNSPECIFIED, UNSPECIFIED VESSEL OR LESION TYPE: ICD-10-CM

## 2018-09-20 DIAGNOSIS — D68.59 PRIMARY HYPERCOAGULABLE STATE (H): ICD-10-CM

## 2018-09-20 DIAGNOSIS — R29.6 FREQUENT FALLS: ICD-10-CM

## 2018-10-04 ENCOUNTER — AMBULATORY - HEALTHEAST (OUTPATIENT)
Dept: GERIATRICS | Facility: CLINIC | Age: 83
End: 2018-10-04

## 2021-05-24 ENCOUNTER — RECORDS - HEALTHEAST (OUTPATIENT)
Dept: ADMINISTRATIVE | Facility: CLINIC | Age: 86
End: 2021-05-24

## 2021-05-30 VITALS — WEIGHT: 219 LBS | BODY MASS INDEX: 31.42 KG/M2

## 2021-05-30 VITALS — WEIGHT: 219.9 LBS | BODY MASS INDEX: 31.55 KG/M2

## 2021-05-30 VITALS — BODY MASS INDEX: 30.98 KG/M2 | WEIGHT: 215.9 LBS

## 2021-05-31 ENCOUNTER — RECORDS - HEALTHEAST (OUTPATIENT)
Dept: ADMINISTRATIVE | Facility: CLINIC | Age: 86
End: 2021-05-31

## 2021-05-31 VITALS — WEIGHT: 227 LBS | BODY MASS INDEX: 32.57 KG/M2

## 2021-05-31 VITALS — BODY MASS INDEX: 31.42 KG/M2 | WEIGHT: 219 LBS

## 2021-05-31 VITALS — WEIGHT: 215.1 LBS | BODY MASS INDEX: 30.86 KG/M2

## 2021-05-31 VITALS — WEIGHT: 225.4 LBS | BODY MASS INDEX: 32.34 KG/M2

## 2021-05-31 VITALS — BODY MASS INDEX: 32.89 KG/M2 | WEIGHT: 229.2 LBS

## 2021-05-31 VITALS — BODY MASS INDEX: 32.57 KG/M2 | WEIGHT: 227 LBS

## 2021-05-31 VITALS — WEIGHT: 219.3 LBS | BODY MASS INDEX: 31.47 KG/M2

## 2021-06-01 VITALS — BODY MASS INDEX: 32.86 KG/M2 | WEIGHT: 229 LBS

## 2021-06-01 VITALS — WEIGHT: 231.7 LBS | BODY MASS INDEX: 33.25 KG/M2

## 2021-06-01 VITALS — BODY MASS INDEX: 32.67 KG/M2 | WEIGHT: 227.7 LBS

## 2021-06-01 VITALS — WEIGHT: 222.2 LBS | BODY MASS INDEX: 31.88 KG/M2

## 2021-06-01 VITALS — WEIGHT: 228.3 LBS | BODY MASS INDEX: 32.76 KG/M2

## 2021-06-01 VITALS — BODY MASS INDEX: 32.77 KG/M2 | WEIGHT: 228.4 LBS

## 2021-06-01 VITALS — BODY MASS INDEX: 32.89 KG/M2 | WEIGHT: 229.2 LBS

## 2021-06-02 VITALS — BODY MASS INDEX: 32.92 KG/M2 | WEIGHT: 229.4 LBS

## 2021-06-02 VITALS — HEIGHT: 71 IN | BODY MASS INDEX: 31.5 KG/M2 | WEIGHT: 225 LBS

## 2021-06-08 NOTE — PROGRESS NOTES
Date: 2017    Name:  Ezekiel Chi  :  1925  MRN: [id number]  Code Status:  DNR    Visit Type: Review Of Multiple Medical Conditions (alzeimer's dementia, CAD, malnutrition)     Facility:  [facility] [[id number]]  Facility Type:  (Long Term Care, LTC)    History of Present Illness:      This is a 89-year-old male here with Alzheimer's dementia and psychosis. His episodes of anxiety related to his Alzheimer's dementia has improved with increased Risperdal 0.25 mg TID.  His wife does visit him daily and she's very involved in his care. Staff has no other reports of any issues or concerns at this point.  He has not had any fevers and cough, CP or SOB.  His appetite is good accg to staff.  Other ROS negative.    Past Medical History     Esophageal reflux      Right Bundle Branch Block With Left Anterior Fascicular Block      Urinary Frequency More Than Twice At Night (Nocturia)      Sleep Apnea      Sebaceous cyst      Factor V Leiden Mutation      Created by [employer] [facility] Annotation: Oct 28 2008  2:00PM - Corey Moser: heterozygous      Osteoarthritis Of The Knee      Hyperlipidemia      Dementia of the Alzheimer's type 2014     Mood disorder in conditions classified elsewhere 2014     Depression with anxiety 2014     Coronary atherosclerosis of unspecified type of vessel, native or graft      Pressure ulcer, heel      PVD (peripheral vascular disease)      Other protein-calorie malnutrition      Myocardial infarction      Anxiety      Adult failure to thrive      Cardiac dysrhythmia, unspecified      Abnormal posture      Sleep apnea      Osteoarthritis of knee      Past Surgical History:   Procedure Laterality Date     CARDIAC SURGERY         Medications: reviewed    PHYSICAL EXAM:  223 lbs, 102,64, 98.1, 89, 20, 90%  This is an alert male up in his wheelchair sitting by the door of his room watching people as they walked by.   HEENT:Normocephalic/atraumatic Conjunctivae  without erythema nares are clear of any drainage.  Neck: No adenopathy JVD thyromegaly  Resp: Clear No rhonchi wheezing rales  Cardio: Regular rate and rhythm 2/6 murmur appreciated  Abdomen: Soft nontender no masses distention bowel sounds are present.  Extremities: 1+ lower extremity edema fair peripheral pulses  Skin: Skin intact No noted skin issues  : N/A  Musculoskeletal: Age-related degenerative joint disease  Psych: Alert oriented to self only      Labs:  reivewed    Diagnoses/Plan  1. Dementia of the Alzheimer's type  No behavioral changes noted after increasing risperdal,    2. Essential hypertension with goal blood pressure less than 140/90  stable   3. Bilateral lower extremity edema  Stable on furosemide, will check BMP but first needs to talk with wife about since she want ot be involved in all decision making   4. Coronary Artery Disease  Stable on ASA   5. Depression with anxiety  stable on Mirtazepine   6. Gastroesophageal reflux disease without esophagitis  No sx           Electronically signed by: Shayna Edwards MD         ------

## 2021-06-09 NOTE — PROGRESS NOTES
[facility]    DATE: 2017    NAME:  Ezekiel Chi             :  1925  MRN: [id number]    CODE STATUS:  DNR  VISIT TYPE: REVIEW OF MULTIPLE CHRONIC MEDICAL CONDITIONS  FACILITY:  [facility] [[id number]]             CHIEF COMPLAIN/REASON FOR VISIT:   Chief Complaint   Patient presents with     Review Of Multiple Medical Conditions     HISTORY OF PRESENT ILLNESS: Ezekiel Chi is a 89 y.o. male is being seen today in the long term care facility for follow up and management of multiple chronic medical conditions.   He has Alzheimer's Dementia with psychosis. He does still have episodes of anxiety related to his Alzheimer's dementia. He's been on Risperdal 0.25 mg TID and staff has requested an increase in his medication secondarily to his episodes of anxiety usually inthe late afternoon and evening.    He does have a history of frequent falls.  Last fall in February 10, 2016.  while trying to self transfer.  Resident was found in a seated position on the floor by nursing staff. Resident had attempted to get out of bed without assistance. He stated that he wanted to see his wife. Resident denied hitting his head during the fall.  Patient does require a mechanical lift for all transfers and one was used to get patient back in bed.    Today, patient was seen in his wheelchair in the doorway of his room, per usual.  He listens to music. Patient has high risk for falls with his last fall noted on  10/9/16. He has a perimeter mattress on his bed.  He sits in his Broda chair outside his room daily.  He is incontinent of bowel and bladder and is at risk for skin breakdown. Nursing staff does have him off loaded to reduce pressure.      Past Medical History:   Diagnosis Date     Abnormal posture      Adult failure to thrive      Alzheimer Disease Late Onset     Created by Conversion      Anxiety      Arthritis      Cardiac dysrhythmia, unspecified      Coronary artery disease       Coronary Artery Disease     Created by Conversion      Coronary atherosclerosis of unspecified type of vessel, native or graft      Dementia of the Alzheimer's type 7/13/2014     Depression with anxiety 7/29/2014     Difficulty in walking      Encephalopathy acute 7/29/2014     Esophageal reflux     Created by Conversion      Factor V Leiden Mutation     Created by Conversion [facility] Annotation: Oct 28 2008  2:00PM - Corey Moser: heterozygous      Fatigue     Created by Conversion      Fever 7/28/2014     Hyperlipidemia     Created by Conversion      Major Depression, Single Episode     Created by Conversion      Mood disorder in conditions classified elsewhere 7/29/2014     Muscle weakness (generalized)      Muscle Weakness Generalized     Created by Conversion      Myocardial infarction      Osteoarthritis of knee      Osteoarthritis Of The Knee     Created by Conversion      Other protein-calorie malnutrition      Pressure ulcer, heel      PVD (peripheral vascular disease)      Right Bundle Branch Block With Left Anterior Fascicular Block     Created by Conversion      Sebaceous cyst     Created by Conversion      Sleep Apnea     Created by Conversion      Sleep apnea      Urinary Frequency More Than Twice At Night (Nocturia)     Created by Conversion      Past Surgical History:   Procedure Laterality Date     CARDIAC SURGERY       Social History     Social History     Marital status:      Spouse name: N/A     Number of children: N/A     Years of education: N/A     Occupational History     Not on file.     Social History Main Topics     Smoking status: Former Smoker     Quit date: 7/28/1960     Smokeless tobacco: Never Used     Alcohol use No     Drug use: No     Sexual activity: No     Other Topics Concern     Not on file     Social History Narrative     No narrative on file       ALLERGY: No Known Allergies    DIET: Regular    MEDICATIONS:     Current Outpatient Prescriptions   Medication Sig      acetaminophen (TYLENOL) 325 MG tablet Take 650 mg by mouth every 6 (six) hours as needed for pain (Don't exceed 4,000mg acetaminophen in 24 hours).      aspirin 81 MG tablet Take 81 mg by mouth daily. Don't crush or swallow.     bisacodyl (DULCOLAX) 10 mg suppository Insert 10 mg into the rectum every third day as needed.      dextromethorphan-guaiFENesin (ROBITUSSIN-DM)  mg/5 mL liquid Take 10 mL by mouth every 4 (four) hours.     furosemide (LASIX) 20 MG tablet Take 20 mg by mouth daily.      ipratropium-albuterol (DUO-NEB) 0.5-2.5 mg/3 mL nebulizer 3 mL 4 (four) times a day as needed. (starting 2/10)     loperamide (IMODIUM) 2 mg capsule Take 1-2 mg by mouth 4 (four) times a day as needed for diarrhea.      menthol-zinc oxide (CALMOSEPTINE) 0.44-20.6 % Oint ointment Apply 1 application topically 3 (three) times a day as needed.     menthol-zinc oxide (CALMOSEPTINE) 0.44-20.6 % Oint ointment Apply 1 application topically 3 (three) times a day.     mirtazapine (REMERON) 15 MG tablet TAKE ONE TABLET BY MOUTH EVERY NIGHT AT BEDTIME     multivitamin (MULTIVITAMIN) per tablet Take 1 tablet by mouth daily.     nitroglycerin (NITROSTAT) 0.4 MG SL tablet Place 0.4 mg under the tongue every 5 (five) minutes as needed for chest pain (Dissolve under tongue every 5 min up to 3 doses for chest pain as needed. Don't chew or swallow.).      risperiDONE (RISPERDAL) 0.25 MG tablet TAKE 1 TABLET BY MOUTH TWICE DAILY& 2 TABLETS EVERY NIGHT AT BEDTIME (Patient taking differently: TAKE 1 TABLET BY MOUTH  DAILY AT NOON & 2 TABLETS EVERY MORNING & AT BEDTIME)     senna-docusate (SENNOSIDES-DOCUSATE SODIUM) 8.6-50 mg tablet Take 1 tablet by mouth daily.      sodium fluoride (DENTAGEL) 1.1 % Gel dental gel Apply 1 application to teeth bedtime.     wound dressings (TRIAD WOUND DRESSING) Pste Apply 1 application topically every 8 (eight) hours. Apply to coccyyx     REVIEW OF SYSTEMS    Currently has no fever, chills, or rigors.   He does not have any visual problems but is hard of hearing. He denies any chest pain, headaches, palpitations, lightheadedness, dizziness,  shortness of breath, or cough.  His appetite is good, he denies any GERD symptoms, he denies any difficulty with swallowing, nausea, or vomiting.  He denies any abdominal pain, diarrhea or constipation. He denies any urinary symptoms. No insomnia.  No active bleeding.  No rash.  He does have chronic bilateral lower extremity edema.    PHYSICAL EXAMINATION:    Vitals:    03/04/17 1832   BP: 133/82   Pulse: 64   Resp: 18   Temp: 97.5  F (36.4  C)   SpO2: 92%   Weight: 219 lb (99.3 kg)     HEENT:Normocephalic/atraumatic Conjunctivae without erythema nares are clear of any drainage.  Neck: No adenopathy JVD thyromegaly  Resp: Clear No rhonchi wheezing rales  Cardio: Regular rate and rhythm 2/6 murmur appreciated  Abdomen: Soft nontender no masses distention bowel sounds are present.  Extremities: 1+ lower extremity edema fair peripheral pulses  Skin: Skin intact No noted skin issues  : N/A  Musculoskeletal: Age-related degenerative joint disease  Psych: Alert oriented to self only    LABS:      Lab Results   Component Value Date    WBC 7.4 12/01/2015    HGB 12.7 (L) 12/01/2015    HCT 37.6 (L) 12/01/2015    MCV 90 12/01/2015     12/01/2015     Results for orders placed or performed in visit on 11/16/14   BASIC METABOLIC PANEL   Result Value Ref Range    Sodium 139 136 - 145 mmol/L    Potassium 4.0 3.5 - 5.0 mmol/L    Chloride 100 98 - 107 mmol/L    CO2 32 (H) 22 - 31 mmol/L    Anion Gap, Calculation 7 5 - 18 mmol/L    Glucose 107 70 - 125 mg/dL    Calcium 8.8 8.5 - 10.5 mg/dL    BUN 18 8 - 28 mg/dL    Creatinine 0.68 (L) 0.70 - 1.30 mg/dL    GFR MDRD Af Amer >60 >60 mL/min/1.73m2    GFR MDRD Non Af Amer >60 >60 mL/min/1.73m2       Lab Results   Component Value Date    [id number] 559 03/02/2015     VITAMIN D, TOTAL (25-HYDROXY)   Date Value Ref Range Status   11/30/2010 41.5  30.0 - 80.0 ng/mL Final     Comment:                    Deficiency              <10.0 ng/mL               Insufficiency       10.0-29.9 ng/mL               Sufficiency         30.0-80.0 ng/mL               Toxicity (possible)    >100.0 ng/mL     Lab Results   Component Value Date    TSH 2.75 03/02/2015     Lab Results   Component Value Date    ALBUMIN 3.0 (L) 01/19/2017       ASSESSMENT/PLAN:    1. Mood disorder in conditions classified elsewhere - Stable on Risperdal   2. Dementia with behavioral disturbance - Last TSH WNL and Vitamin B 12 559   3. Essential hypertension with goal blood pressure less than 140/90 - Blood pressures are within target range, continues on Lasix. Last BMP revealed BUN 18,  Cr 0.68, and GFR > 60   4. Depression with anxiety - Stable on Mirtazapine           CARE PLAN: The care plan has been reviewed and discussed with patient and nursing staff. No changes made at this time.  We will continue to monitor        Electronically signed by: Tee Nguyen CNP

## 2021-06-09 NOTE — PROGRESS NOTES
[facility]    DATE: 3/13/2017    NAME:  Ezekiel Chi             :  1925  MRN: [id number]    CODE STATUS:  DNR  VISIT TYPE: REVIEW OF MULTIPLE CHRONIC MEDICAL CONDITIONS  FACILITY:  [facility] [[id number]]             CHIEF COMPLAIN/REASON FOR VISIT:   Chief Complaint   Patient presents with     Review Of Multiple Medical Conditions     HISTORY OF PRESENT ILLNESS: Ezekiel Chi is a 89 y.o. male is being seen today in the long term care facility for follow up and management of multiple chronic medical conditions.   He has Alzheimer's Dementia with psychosis. He does still have episodes of anxiety related to his Alzheimer's dementia. He's been on Risperdal 0.25 mg TID and staff has requested an increase in his medication secondarily to his episodes of anxiety usually inthe late afternoon and evening.    He does have a history of frequent falls.  Last fall in February 10, 2016.  while trying to self transfer.  Resident was found in a seated position on the floor by nursing staff. Resident had attempted to get out of bed without assistance. He stated that he wanted to see his wife. Resident denied hitting his head during the fall.  Patient does require a mechanical lift for all transfers and one was used to get patient back in bed.    Today, patient was seen in his wheelchair in the doorway of his room, per usual.  Nursing staff reports a rash on his upper right chest.  Ezekiel denies any pruritis.  He is afebrile. His anxiety is well controlled with no recent excaerbations.  He has an open area to coccyx-noted to be a re-occurring pressure area to same location on coccyx. He has been put on a tissue tolerance regimen of offloading every 11/2 hours, per nursing notes.  Added 1 scoop protein powder to 4oz fluid of choice bid to help promote wound healing/skin integrity. Has MVI ordered. RD updated.  Weight and appetite is stable with patient consuming grater than 50% of  meals with occasional refusal, per his usual.     Past Medical History:   Diagnosis Date     Abnormal posture      Adult failure to thrive      Alzheimer Disease Late Onset     Created by Conversion      Anxiety      Arthritis      Cardiac dysrhythmia, unspecified      Coronary artery disease      Coronary Artery Disease     Created by Conversion      Coronary atherosclerosis of unspecified type of vessel, native or graft      Dementia of the Alzheimer's type 7/13/2014     Depression with anxiety 7/29/2014     Difficulty in walking      Encephalopathy acute 7/29/2014     Esophageal reflux     Created by Conversion      Factor V Leiden Mutation     Created by Conversion [facility] Annotation: Oct 28 2008  2:00PM - Corey Moser: heterozygous      Fatigue     Created by Conversion      Fever 7/28/2014     Hyperlipidemia     Created by Conversion      Major Depression, Single Episode     Created by Conversion      Mood disorder in conditions classified elsewhere 7/29/2014     Muscle weakness (generalized)      Muscle Weakness Generalized     Created by Conversion      Myocardial infarction      Osteoarthritis of knee      Osteoarthritis Of The Knee     Created by Conversion      Other protein-calorie malnutrition      Pressure ulcer, heel      PVD (peripheral vascular disease)      Right Bundle Branch Block With Left Anterior Fascicular Block     Created by Conversion      Sebaceous cyst     Created by Conversion      Sleep Apnea     Created by Conversion      Sleep apnea      Urinary Frequency More Than Twice At Night (Nocturia)     Created by Conversion      Past Surgical History:   Procedure Laterality Date     CARDIAC SURGERY       Social History     Social History     Marital status:      Spouse name: N/A     Number of children: N/A     Years of education: N/A     Occupational History     Not on file.     Social History Main Topics     Smoking status: Former Smoker     Quit date: 7/28/1960     Smokeless  tobacco: Never Used     Alcohol use No     Drug use: No     Sexual activity: No     Other Topics Concern     Not on file     Social History Narrative     No narrative on file       ALLERGY: No Known Allergies    DIET: Regular    MEDICATIONS:     Current Outpatient Prescriptions   Medication Sig     acetaminophen (TYLENOL) 325 MG tablet Take 650 mg by mouth every 6 (six) hours as needed for pain (Don't exceed 4,000mg acetaminophen in 24 hours).      aspirin 81 MG tablet Take 81 mg by mouth daily. Don't crush or swallow.     bisacodyl (DULCOLAX) 10 mg suppository Insert 10 mg into the rectum every third day as needed.      dextromethorphan-guaiFENesin (ROBITUSSIN-DM)  mg/5 mL liquid Take 10 mL by mouth every 4 (four) hours.     furosemide (LASIX) 20 MG tablet Take 20 mg by mouth daily.      ipratropium-albuterol (DUO-NEB) 0.5-2.5 mg/3 mL nebulizer 3 mL 4 (four) times a day as needed. (starting 2/10)     loperamide (IMODIUM) 2 mg capsule Take 1-2 mg by mouth 4 (four) times a day as needed for diarrhea.      menthol-zinc oxide (CALMOSEPTINE) 0.44-20.6 % Oint ointment Apply 1 application topically 3 (three) times a day as needed.     menthol-zinc oxide (CALMOSEPTINE) 0.44-20.6 % Oint ointment Apply 1 application topically 3 (three) times a day.     mirtazapine (REMERON) 15 MG tablet TAKE ONE TABLET BY MOUTH EVERY NIGHT AT BEDTIME     multivitamin (MULTIVITAMIN) per tablet Take 1 tablet by mouth daily.     nitroglycerin (NITROSTAT) 0.4 MG SL tablet Place 0.4 mg under the tongue every 5 (five) minutes as needed for chest pain (Dissolve under tongue every 5 min up to 3 doses for chest pain as needed. Don't chew or swallow.).      risperiDONE (RISPERDAL) 0.25 MG tablet TAKE 1 TABLET BY MOUTH TWICE DAILY& 2 TABLETS EVERY NIGHT AT BEDTIME (Patient taking differently: TAKE 1 TABLET BY MOUTH  DAILY AT NOON & 2 TABLETS EVERY MORNING & AT BEDTIME)     senna-docusate (SENNOSIDES-DOCUSATE SODIUM) 8.6-50 mg tablet Take 1 tablet  by mouth daily.      sodium fluoride (DENTAGEL) 1.1 % Gel dental gel Apply 1 application to teeth bedtime.     wound dressings (TRIAD WOUND DRESSING) Pste Apply 1 application topically every 8 (eight) hours. Apply to coccyyx     REVIEW OF SYSTEMS    Currently has no fever, chills, or rigors.  He does not have any visual problems but is hard of hearing. He denies any chest pain, headaches, palpitations, lightheadedness, dizziness,  shortness of breath, or cough.  His appetite is good, he denies any GERD symptoms, he denies any difficulty with swallowing, nausea, or vomiting.  He denies any abdominal pain, diarrhea or constipation. He denies any urinary symptoms. No insomnia.  No active bleeding.  No rash.  He does have chronic bilateral lower extremity edema.    PHYSICAL EXAMINATION:    Vitals:    03/13/17 1149   BP: 132/66   Pulse: 85   Resp: 16   Temp: 97.7  F (36.5  C)   SpO2: 92%   Weight: 219 lb 14.4 oz (99.7 kg)     HEENT:Normocephalic/atraumatic Conjunctivae without erythema nares are clear of any drainage.  Neck: No adenopathy JVD thyromegaly  Resp: Clear No rhonchi wheezing rales  Cardio: Regular rate and rhythm 2/6 murmur appreciated  Abdomen: Soft nontender no masses distention bowel sounds are present.  Extremities: 1+ lower extremity edema fair peripheral pulses  Skin: Skin intact No noted skin issues  : N/A  Musculoskeletal: Age-related degenerative joint disease  Psych: Alert oriented to self only    LABS:      Lab Results   Component Value Date    WBC 7.4 12/01/2015    HGB 12.7 (L) 12/01/2015    HCT 37.6 (L) 12/01/2015    MCV 90 12/01/2015     12/01/2015     Results for orders placed or performed in visit on 11/16/14   BASIC METABOLIC PANEL   Result Value Ref Range    Sodium 139 136 - 145 mmol/L    Potassium 4.0 3.5 - 5.0 mmol/L    Chloride 100 98 - 107 mmol/L    CO2 32 (H) 22 - 31 mmol/L    Anion Gap, Calculation 7 5 - 18 mmol/L    Glucose 107 70 - 125 mg/dL    Calcium 8.8 8.5 - 10.5 mg/dL     BUN 18 8 - 28 mg/dL    Creatinine 0.68 (L) 0.70 - 1.30 mg/dL    GFR MDRD Af Amer >60 >60 mL/min/1.73m2    GFR MDRD Non Af Amer >60 >60 mL/min/1.73m2       Lab Results   Component Value Date    [id number] 559 03/02/2015     VITAMIN D, TOTAL (25-HYDROXY)   Date Value Ref Range Status   11/30/2010 41.5 30.0 - 80.0 ng/mL Final     Comment:                    Deficiency              <10.0 ng/mL               Insufficiency       10.0-29.9 ng/mL               Sufficiency         30.0-80.0 ng/mL               Toxicity (possible)    >100.0 ng/mL     Lab Results   Component Value Date    TSH 2.75 03/02/2015     Lab Results   Component Value Date    ALBUMIN 3.0 (L) 01/19/2017       ASSESSMENT/PLAN:    1. Rash - Start Hydrocortisone 1% cream TID   2. Depression with anxiety  - Stable on Mirtazapine   3. Dementia of the Alzheimer's type  - Last TSH WNL and Vitamin B 12 559.  Neurologically stable.  Will check Vitamin B12 and TSH   4. Frequent falls - No recent falls   5. Hypoalbuminemia due to protein-calorie malnutrition - Last Albumin 3.  HNS BID. Followed by RD   6. Muscle Weakness Generalized - Ongoing, wheelchair bound   7. Deep tissue injury - Added 1 scoop protein powder to 4oz fluid of choice bid to help promote wound healing/skin integrity. Followed by RD.             CARE PLAN: The care plan has been reviewed and discussed with patient and nursing staff. Will check CBC, BMP, and Vitamin D.  We will continue to monitor        Electronically signed by: Tee Nguyen CNP

## 2021-06-10 NOTE — PROGRESS NOTES
Date: 2017    Name:  Ezekiel Chi  :  1925  MRN: [id number]  Code Status:  DNR    Visit Type: Review Of Multiple Medical Conditions (Alzheimer's dementia, history of CAD, intermittent behavioral changes, recent influenza)     Facility:  [facility] [[id number]]  Facility Type:  (Long Term Care, LTC)    History of Present Illness:      This is a 89-year-old male here with Alzheimer's dementia and psychosis. His episodes of anxiety related to his Alzheimer's dementia has improved with increased Risperdal 0.05 mg twice daily, 0.25 mg daily.  His wife does visit him daily and she's very involved in his care. Staff has been doing dressing changes with his coccyx ulcer which has been improving according to them.  He is also being monitored  dietitian for his malnutrition.  He has not had any fevers and cough, CP or SOB since his bout with influenza a few weeks ago.  His appetite is good accg to staff.  Other ROS negative.    Past Medical History     Esophageal reflux      Right Bundle Branch Block With Left Anterior Fascicular Block      Urinary Frequency More Than Twice At Night (Nocturia)      Sleep Apnea      Sebaceous cyst      Factor V Leiden Mutation      Created by [facility] Annotation: Oct 28 2008  2:00PM - Corey Moser: heterozygous      Osteoarthritis Of The Knee      Hyperlipidemia      Dementia of the Alzheimer's type 2014     Mood disorder in conditions classified elsewhere 2014     Depression with anxiety 2014     Coronary atherosclerosis of unspecified type of vessel, native or graft      Pressure ulcer, heel      PVD (peripheral vascular disease)      Other protein-calorie malnutrition      Myocardial infarction      Anxiety      Adult failure to thrive      Cardiac dysrhythmia, unspecified      Abnormal posture      Sleep apnea      Osteoarthritis of knee      Past Surgical History:   Procedure Laterality Date     CARDIAC SURGERY         Medications:  reviewed    PHYSICAL EXAM:  223 lbs, 102,64, 98.1, 89, 20, 90%  This is an alert male up in his wheelchair sitting by the door of his room watching people as they walked by.   HEENT:Normocephalic/atraumatic Conjunctivae without erythema nares are clear of any drainage.  Neck: No adenopathy JVD thyromegaly  Resp: Clear No rhonchi wheezing rales  Cardio: Regular rate and rhythm 2/6 murmur appreciated  Abdomen: Soft nontender no masses distention bowel sounds are present.  Extremities: 1+ lower extremity edema fair peripheral pulses  Skin: Skin intact No noted skin issues  : N/A  Musculoskeletal: Age-related degenerative joint disease  Psych: Alert oriented to self only      Labs:  reivewed    Diagnoses/Plan  1. Dementia of the Alzheimer's type     2. Depression with anxiety     3. Influenza     4. Mood disorder in conditions classified elsewhere     5. Coronary Artery Disease     6. Essential hypertension with goal blood pressure less than 140/90     7. Hypoalbuminemia due to protein-calorie malnutrition       1. Dementia of the Alzheimer's type  No behavioral changes noted after increasing risperdal, discussion was done with his wife today and wife has not noted any further decline in his cognitive status    2. Essential hypertension with goal blood pressure less than 140/90  stable   3. Bilateral lower extremity edema  Stable on furosemide, will check BMP but first needs to talk with wife about since she want ot be involved in all decision making   4. Coronary Artery Disease  Stable on ASA   5. Depression with anxiety  stable on Mirtazepine   6.  Malnutrition with coccyx ulcer   continue dressing changes, ulcer has improved, hypoalbuminemia being corrected per nutrition supplement per dietitian    7.      Recent influenza- now resolved after Tamiflu        Electronically signed by: Shayna Edwards MD         ------

## 2021-06-10 NOTE — PROGRESS NOTES
[facility]    DATE: 2017    NAME:  Ezekiel Chi             :  1925  MRN: [id number]    CODE STATUS:  DNR  VISIT TYPE: REVIEW OF MULTIPLE CHRONIC MEDICAL CONDITIONS  FACILITY:  [facility] [[id number]]             CHIEF COMPLAIN/REASON FOR VISIT:   Chief Complaint   Patient presents with     Review Of Multiple Medical Conditions     HISTORY OF PRESENT ILLNESS: Ezekiel Chi is a 89 y.o. male is being seen today in the long term care facility for follow up and management of multiple chronic medical conditions.   He has Alzheimer's Dementia with psychosis. He does still have episodes of anxiety related to his Alzheimer's dementia. He's been on Risperdal 0.25 mg TID and staff has requested an increase in his medication secondarily to his episodes of anxiety usually inthe late afternoon and evening.    He does have a history of frequent falls.  Last fall in February 10, 2016.  while trying to self transfer.  Resident was found in a seated position on the floor by nursing staff. Resident had attempted to get out of bed without assistance. He stated that he wanted to see his wife. Resident denied hitting his head during the fall.  Patient does require a mechanical lift for all transfers and one was used to get patient back in bed.    Today, patient was seen in his wheelchair in the doorway of his room, per usual.  Nursing staff offers no specific complaints.      Past Medical History:   Diagnosis Date     Abnormal posture      Adult failure to thrive      Alzheimer Disease Late Onset     Created by Conversion      Anxiety      Arthritis      Cardiac dysrhythmia, unspecified      Coronary artery disease      Coronary Artery Disease     Created by Conversion      Coronary atherosclerosis of unspecified type of vessel, native or graft      Dementia of the Alzheimer's type 2014     Depression with anxiety 2014     Difficulty in walking      Encephalopathy acute  7/29/2014     Esophageal reflux     Created by Conversion      Factor V Leiden Mutation     Created by Conversion [facility] Annotation: Oct 28 2008  2:00PM - Corey Moser: heterozygous      Fatigue     Created by Conversion      Fever 7/28/2014     Hyperlipidemia     Created by Conversion      Major Depression, Single Episode     Created by Conversion      Mood disorder in conditions classified elsewhere 7/29/2014     Muscle weakness (generalized)      Muscle Weakness Generalized     Created by Conversion      Myocardial infarction      Osteoarthritis of knee      Osteoarthritis Of The Knee     Created by Conversion      Other protein-calorie malnutrition      Pressure ulcer, heel      PVD (peripheral vascular disease)      Right Bundle Branch Block With Left Anterior Fascicular Block     Created by Conversion      Sebaceous cyst     Created by Conversion      Sleep Apnea     Created by Conversion      Sleep apnea      Urinary Frequency More Than Twice At Night (Nocturia)     Created by Conversion      Past Surgical History:   Procedure Laterality Date     CARDIAC SURGERY       Social History     Social History     Marital status:      Spouse name: N/A     Number of children: N/A     Years of education: N/A     Occupational History     Not on file.     Social History Main Topics     Smoking status: Former Smoker     Quit date: 7/28/1960     Smokeless tobacco: Never Used     Alcohol use No     Drug use: No     Sexual activity: No     Other Topics Concern     Not on file     Social History Narrative     No narrative on file       ALLERGY: No Known Allergies    DIET: Regular    MEDICATIONS:     Current Outpatient Prescriptions   Medication Sig     acetaminophen (TYLENOL) 325 MG tablet Take 650 mg by mouth every 6 (six) hours as needed for pain (Don't exceed 4,000mg acetaminophen in 24 hours).      aspirin 81 MG tablet Take 81 mg by mouth daily. Don't crush or swallow.     bisacodyl (DULCOLAX) 10 mg  suppository Insert 10 mg into the rectum every third day as needed.      dextromethorphan-guaiFENesin (ROBITUSSIN-DM)  mg/5 mL liquid Take 10 mL by mouth every 4 (four) hours.     ergocalciferol (VITAMIN D2) 50,000 unit capsule Take 50,000 Units by mouth once a week.     furosemide (LASIX) 20 MG tablet Take 20 mg by mouth daily.      ipratropium-albuterol (DUO-NEB) 0.5-2.5 mg/3 mL nebulizer 3 mL 4 (four) times a day as needed. (starting 2/10)     loperamide (IMODIUM) 2 mg capsule Take 1-2 mg by mouth 4 (four) times a day as needed for diarrhea.      menthol-zinc oxide (CALMOSEPTINE) 0.44-20.6 % Oint ointment Apply 1 application topically 3 (three) times a day as needed.     menthol-zinc oxide (CALMOSEPTINE) 0.44-20.6 % Oint ointment Apply 1 application topically 3 (three) times a day.     mirtazapine (REMERON) 15 MG tablet TAKE ONE TABLET BY MOUTH EVERY NIGHT AT BEDTIME     multivitamin (MULTIVITAMIN) per tablet Take 1 tablet by mouth daily.     nitroglycerin (NITROSTAT) 0.4 MG SL tablet Place 0.4 mg under the tongue every 5 (five) minutes as needed for chest pain (Dissolve under tongue every 5 min up to 3 doses for chest pain as needed. Don't chew or swallow.).      risperiDONE (RISPERDAL) 0.25 MG tablet TAKE 1 TABLET BY MOUTH  DAILY AT NOON & 2 TABLETS EVERY MORNING & AT BEDTIME     senna-docusate (SENNOSIDES-DOCUSATE SODIUM) 8.6-50 mg tablet Take 1 tablet by mouth daily.      sodium fluoride (DENTAGEL) 1.1 % Gel dental gel Apply 1 application to teeth bedtime.     wound dressings (TRIAD WOUND DRESSING) Pste Apply 1 application topically every 8 (eight) hours. Apply to coccyyx     REVIEW OF SYSTEMS    Currently has no fever, chills, or rigors.  He does not have any visual problems but is hard of hearing. He denies any chest pain, headaches, palpitations, lightheadedness, dizziness,  shortness of breath, or cough.  His appetite is good, he denies any GERD symptoms, he denies any difficulty with swallowing,  nausea, or vomiting.  He denies any abdominal pain, diarrhea or constipation. He denies any urinary symptoms. No insomnia.  No active bleeding.  No rash.  He does have chronic bilateral lower extremity edema.    PHYSICAL EXAMINATION:    Vitals:    05/11/17 2345   BP: 114/80   Pulse: 74   Resp: 16   Temp: 97.7  F (36.5  C)   SpO2: 94%   Weight: 215 lb 14.4 oz (97.9 kg)     HEENT:Normocephalic/atraumatic Conjunctivae without erythema nares are clear of any drainage.  Neck: No adenopathy JVD thyromegaly  Resp: Clear No rhonchi wheezing rales  Cardio: Regular rate and rhythm 2/6 murmur appreciated  Abdomen: Soft nontender no masses distention bowel sounds are present.  Extremities: 1+ lower extremity edema fair peripheral pulses  Skin: Skin intact No noted skin issues  : N/A  Musculoskeletal: Age-related degenerative joint disease  Psych: Alert oriented to self only    LABS:      Lab Results   Component Value Date    WBC 10.2 03/16/2017    HGB 14.0 03/16/2017    HCT 42.0 03/16/2017    MCV 91 03/16/2017     03/16/2017     Results for orders placed or performed in visit on 03/16/17   Basic Metabolic Panel   Result Value Ref Range    Sodium 138 136 - 145 mmol/L    Potassium 4.5 3.5 - 5.0 mmol/L    Chloride 102 98 - 107 mmol/L    CO2 27 22 - 31 mmol/L    Anion Gap, Calculation 9 5 - 18 mmol/L    Glucose 99 70 - 125 mg/dL    Calcium 8.7 8.5 - 10.5 mg/dL    BUN 13 8 - 28 mg/dL    Creatinine 0.86 0.70 - 1.30 mg/dL    GFR MDRD Af Amer >60 >60 mL/min/1.73m2    GFR MDRD Non Af Amer >60 >60 mL/min/1.73m2       Lab Results   Component Value Date    [id number] 429 03/16/2017     Vitamin D, Total (25-Hydroxy)   Date Value Ref Range Status   03/16/2017 19.0 (L) 30.0 - 80.0 ng/mL Final     Lab Results   Component Value Date    TSH 2.58 03/16/2017     Lab Results   Component Value Date    ALBUMIN 3.0 (L) 01/19/2017       ASSESSMENT/PLAN:    1. Dementia of the Alzheimer's type -  Last TSH 2.58 and Vitamin B 12 429.   Neurologically stable.     2. Depression with anxiety - Mood stable on Mirtazapine   3. Essential hypertension with goal blood pressure less than 140/90 - Blood pressure are within target range   4. Rash - Improved with Hydrocortisone cream   5. Muscle Weakness Generalized - Ongoing, wheelchair bound/Jossy chair                   CARE PLAN: The care plan has been reviewed and discussed with patient and nursing staff. No changes made at this time. We will continue to monitor        Electronically signed by: Tee Nguyen CNP

## 2021-06-11 NOTE — PROGRESS NOTES
Date: 2017    Name:  Ezekiel Chi  :  1925  MRN: [id number]  Code Status:  DNR    Visit Type: Review Of Multiple Medical Conditions (Multiple medical issues)     Facility:  [facility] [[id number]]  Facility Type:  (Long Term Care, LTC)    History of Present Illness:      This is a 89-year-old male here with Alzheimer's dementia and psychosis. His episodes of anxiety related to his Alzheimer's dementia has improved with increased Risperdal 0.05 mg twice daily, 0.25 mg daily.  His wife does visit him daily and she's very involved in his care.  He is also being monitored  dietitian for his malnutrition.  He has not had any fevers and cough, CP or SOB since his bout with influenza in 2017.  He has not had any falls since 2016.  He continues to need mechanical transfers and continues to have help with all his ADLs.  His appetite is good accg to staff.  Other ROS negative.    Past Medical History     Esophageal reflux      Right Bundle Branch Block With Left Anterior Fascicular Block      Urinary Frequency More Than Twice At Night (Nocturia)      Sleep Apnea      Sebaceous cyst      Factor V Leiden Mutation      Created by [facility] Annotation: Oct 28 2008  2:00PM - Corey Moser: heterozygous      Osteoarthritis Of The Knee      Hyperlipidemia      Dementia of the Alzheimer's type 2014     Mood disorder in conditions classified elsewhere 2014     Depression with anxiety 2014     Coronary atherosclerosis of unspecified type of vessel, native or graft      Pressure ulcer, heel      PVD (peripheral vascular disease)      Other protein-calorie malnutrition      Myocardial infarction      Anxiety      Adult failure to thrive      Cardiac dysrhythmia, unspecified      Abnormal posture      Sleep apnea      Osteoarthritis of knee      Past Surgical History:   Procedure Laterality Date     CARDIAC SURGERY         Medications: reviewed    PHYSICAL EXAM:  220.5  pounds, 113/68, 97.3, 82, 16, 91%  This is an alert male up in his wheelchair sitting by the door of his room watching people as they walked by.   HEENT:Normocephalic/atraumatic Conjunctivae without erythema nares are clear of any drainage.  Neck: No adenopathy JVD thyromegaly  Resp: Clear No rhonchi wheezing rales  Cardio: Regular rate and rhythm 2/6 murmur appreciated  Abdomen: Soft nontender no masses distention bowel sounds are present.  Extremities: 1+ lower extremity edema fair peripheral pulses  Skin: Skin intact No noted skin issues  : N/A  Musculoskeletal: Age-related degenerative joint disease  Psych: Alert oriented to self only, follows simple commands      Labs:  reivewed    Diagnoses/Plan  1. Muscle Weakness Generalized     2. Bilateral lower extremity edema     3. Dementia of the Alzheimer's type     4. Essential hypertension with goal blood pressure less than 130/85     5. Gastroesophageal reflux disease without esophagitis     6. Depression with anxiety     7. Coronary Artery Disease       1. Dementia of the Alzheimer's type  No behavioral changes noted after increasing risperdal,  wife has not noted any further decline in his cognitive status    2. Essential hypertension with goal blood pressure less than 140/90  stable   3. Bilateral lower extremity edema  Stable on furosemide, BMP on 3/17 is normal with GFR greater than 60   4. Coronary Artery Disease  Stable on ASA   5. Depression with anxiety  stable on Mirtazepine   6.  Malnutrition with coccyx ulcer   continue dressing changes, ulcer has improved, hypoalbuminemia being corrected per nutrition supplement per dietitian, also has a ROHO cushion for his wheelchair with frequent repositioning           Electronically signed by: Shayna Edwards MD         ------

## 2021-06-11 NOTE — PROGRESS NOTES
[facility]    DATE: 6/15/2017    NAME:  Ezekiel Chi             :  1925  MRN: [id number]    CODE STATUS:  DNR  VISIT TYPE: REVIEW OF MULTIPLE CHRONIC MEDICAL CONDITIONS  FACILITY:  [facility] [[id number]]             CHIEF COMPLAIN/REASON FOR VISIT:   Chief Complaint   Patient presents with     Discharge Summary     HISTORY OF PRESENT ILLNESS: Ezekiel Chi is a 89 y.o. male is being seen today in the long term care facility for follow up and management of multiple chronic medical conditions.   He has Alzheimer's Dementia with psychosis. He does still have episodes of anxiety related to his Alzheimer's dementia. He's been on Risperdal 0.25 mg TID and staff has requested an increase in his medication secondarily to his episodes of anxiety usually inthe late afternoon and evening.    He does have a history of frequent falls.  Last fall in February 10, 2016.  while trying to self transfer.  Resident was found in a seated position on the floor by nursing staff. Resident had attempted to get out of bed without assistance. He stated that he wanted to see his wife. Resident denied hitting his head during the fall.  Patient does require a mechanical lift for all transfers and one was used to get patient back in bed.    Today, patient was seen in his wheelchair in his room in front of the TV. Most of the time, Ezekiel sitting in his doorway watching people walk by or napping. His TV channel is always set on his TV to easy listening or cool jazz. He enjoys waiting there for his wife who comes every day before supper. Ezekiel will attend [employer] services and musical entertainment, with encouragement. It helps to remind him that you will bring him straight home afterwards. Ezekiel also enjoys 1:1 and pet visits. Staff will continue to provide encouragement and escorts as needed to the activities of his interest as well as assistance in turning on his television for his musical  interests. Resident is not always meeting his FMP of 3 minutes of standing daily. Therapy  changed his FMP to a standing program of 2-3 minutes BID.  Perimeter mattress on bed to define borders of bed. He has a history of vivid dreams and  attempting to stand following these dreams. Mattress assists resident in being oriented to bed perimeter and mobility limitations. Resident has not sustained any falls since implementation of perimeter mattress. Last fall 10/9/2016.  Resident uses ROHO cushion in w/c to reduce pressure areas. Resident is turned and repositioned q 2 hours to maintain skin integrity. He has bilateral grab bars to assist with bed mobility. Current interventions are appropriate in reducing fall risk and preserving skin integrity.       Past Medical History:   Diagnosis Date     Abnormal posture      Adult failure to thrive      Alzheimer Disease Late Onset     Created by Conversion      Anxiety      Arthritis      Cardiac dysrhythmia, unspecified      Coronary artery disease      Coronary Artery Disease     Created by Conversion      Coronary atherosclerosis of unspecified type of vessel, native or graft      Dementia of the Alzheimer's type 7/13/2014     Depression with anxiety 7/29/2014     Difficulty in walking      Encephalopathy acute 7/29/2014     Esophageal reflux     Created by Conversion      Factor V Leiden Mutation     Created by Conversion [facility] Annotation: Oct 28 2008  2:00PM - Corey Moser: heterozygous      Fatigue     Created by Conversion      Fever 7/28/2014     Hyperlipidemia     Created by Conversion      Major Depression, Single Episode     Created by Conversion      Mood disorder in conditions classified elsewhere 7/29/2014     Muscle weakness (generalized)      Muscle Weakness Generalized     Created by Conversion      Myocardial infarction      Osteoarthritis of knee      Osteoarthritis Of The Knee     Created by Conversion      Other protein-calorie malnutrition       Pressure ulcer, heel      PVD (peripheral vascular disease)      Right Bundle Branch Block With Left Anterior Fascicular Block     Created by Conversion      Sebaceous cyst     Created by Conversion      Sleep Apnea     Created by Conversion      Sleep apnea      Urinary Frequency More Than Twice At Night (Nocturia)     Created by Conversion      Past Surgical History:   Procedure Laterality Date     CARDIAC SURGERY       Social History     Social History     Marital status:      Spouse name: N/A     Number of children: N/A     Years of education: N/A     Occupational History     Not on file.     Social History Main Topics     Smoking status: Former Smoker     Quit date: 7/28/1960     Smokeless tobacco: Never Used     Alcohol use No     Drug use: No     Sexual activity: No     Other Topics Concern     Not on file     Social History Narrative     No narrative on file       ALLERGY: No Known Allergies    DIET: Regular    MEDICATIONS:     Current Outpatient Prescriptions   Medication Sig     acetaminophen (TYLENOL) 325 MG tablet Take 650 mg by mouth every 6 (six) hours as needed for pain (Don't exceed 4,000mg acetaminophen in 24 hours).      aspirin 81 MG tablet Take 81 mg by mouth daily. Don't crush or swallow.     bisacodyl (DULCOLAX) 10 mg suppository Insert 10 mg into the rectum every third day as needed.      dextromethorphan-guaiFENesin (ROBITUSSIN-DM)  mg/5 mL liquid Take 10 mL by mouth every 4 (four) hours.     furosemide (LASIX) 20 MG tablet Take 20 mg by mouth daily.      ipratropium-albuterol (DUO-NEB) 0.5-2.5 mg/3 mL nebulizer 3 mL 4 (four) times a day as needed. (starting 2/10)     loperamide (IMODIUM) 2 mg capsule Take 1-2 mg by mouth 4 (four) times a day as needed for diarrhea.      menthol-zinc oxide (CALMOSEPTINE) 0.44-20.6 % Oint ointment Apply 1 application topically 3 (three) times a day as needed.     menthol-zinc oxide (CALMOSEPTINE) 0.44-20.6 % Oint ointment Apply 1 application  topically 3 (three) times a day.     mirtazapine (REMERON) 15 MG tablet TAKE ONE TABLET BY MOUTH EVERY NIGHT AT BEDTIME     multivitamin (MULTIVITAMIN) per tablet Take 1 tablet by mouth daily.     nitroglycerin (NITROSTAT) 0.4 MG SL tablet Place 0.4 mg under the tongue every 5 (five) minutes as needed for chest pain (Dissolve under tongue every 5 min up to 3 doses for chest pain as needed. Don't chew or swallow.).      risperiDONE (RISPERDAL) 0.25 MG tablet TAKE 1 TABLET BY MOUTH  DAILY AT NOON & 2 TABLETS EVERY MORNING & AT BEDTIME (Patient taking differently: Take 0.25 mg by mouth daily. TAKE 1 TABLET BY MOUTH  DAILY AT NOON & 2 TABLETS EVERY MORNING & AT BEDTIME)     risperiDONE (RISPERDAL) 0.5 MG tablet Take 0.5 mg by mouth 2 (two) times a day.     senna-docusate (SENNOSIDES-DOCUSATE SODIUM) 8.6-50 mg tablet Take 1 tablet by mouth daily.      sodium fluoride (DENTAGEL) 1.1 % Gel dental gel Apply 1 application to teeth bedtime.     wound dressings (TRIAD WOUND DRESSING) Pste Apply 1 application topically every 8 (eight) hours. Apply to coccyyx     REVIEW OF SYSTEMS    Currently has no fever, chills, or rigors.  He does not have any visual problems but is hard of hearing. He denies any chest pain, headaches, palpitations, lightheadedness, dizziness,  shortness of breath, or cough.  His appetite is good, he denies any GERD symptoms, he denies any difficulty with swallowing, nausea, or vomiting.  He denies any abdominal pain, diarrhea or constipation. He denies any urinary symptoms. No insomnia.  No active bleeding.  No rash.  He does have chronic bilateral lower extremity edema.    PHYSICAL EXAMINATION:    Vitals:    06/19/17 1331   BP: 140/70   Pulse: (!) 58   Resp: 16   Temp: 97.5  F (36.4  C)   SpO2: 98%   Weight: 215 lb 1.6 oz (97.6 kg)     HEENT:Normocephalic/atraumatic Conjunctivae without erythema nares are clear of any drainage.  Neck: No adenopathy JVD thyromegaly  Resp: Clear No rhonchi wheezing  rales  Cardio: Regular rate and rhythm 2/6 murmur appreciated  Abdomen: Soft nontender no masses distention bowel sounds are present.  Extremities: 1+ lower extremity edema fair peripheral pulses  Skin: Skin intact No noted skin issues  : N/A  Musculoskeletal: Age-related degenerative joint disease  Psych: Alert oriented to self only    LABS:      Lab Results   Component Value Date    WBC 10.2 03/16/2017    HGB 14.0 03/16/2017    HCT 42.0 03/16/2017    MCV 91 03/16/2017     03/16/2017     Results for orders placed or performed in visit on 03/16/17   Basic Metabolic Panel   Result Value Ref Range    Sodium 138 136 - 145 mmol/L    Potassium 4.5 3.5 - 5.0 mmol/L    Chloride 102 98 - 107 mmol/L    CO2 27 22 - 31 mmol/L    Anion Gap, Calculation 9 5 - 18 mmol/L    Glucose 99 70 - 125 mg/dL    Calcium 8.7 8.5 - 10.5 mg/dL    BUN 13 8 - 28 mg/dL    Creatinine 0.86 0.70 - 1.30 mg/dL    GFR MDRD Af Amer >60 >60 mL/min/1.73m2    GFR MDRD Non Af Amer >60 >60 mL/min/1.73m2       Lab Results   Component Value Date    [id number] 429 03/16/2017     Vitamin D, Total (25-Hydroxy)   Date Value Ref Range Status   06/12/2017 71.5 30.0 - 80.0 ng/mL Final     Lab Results   Component Value Date    TSH 2.58 03/16/2017     Lab Results   Component Value Date    ALBUMIN 3.0 (L) 01/19/2017       ASSESSMENT/PLAN:    1. Muscle Weakness Generalized - Ongoing, wheelchair bound/Jossy chair   2. Frequent falls - No falls within the last 180 days.  Last fall 10/9/16.  Will continue current plan of care for fall risk reduction (see above).   3. Bilateral lower extremity edema - Stable with Lasix   4. Gastroesophageal reflux disease without esophagitis - Stable   5. Hyperlipidemia - Stable   6. Essential hypertension with goal blood pressure less than 130/85 -  Blood pressure are within target range, will continue Lasix                          CARE PLAN: The care plan has been reviewed and discussed with patient and nursing staff. No changes  made at this time. We will continue to monitor        Electronically signed by: Tee Nguyen CNP

## 2021-06-12 NOTE — PROGRESS NOTES
[facility]    DATE: 2017    NAME:  Ezekiel Chi             :  1925  MRN: [id number]    CODE STATUS:  DNR  VISIT TYPE: REVIEW OF MULTIPLE CHRONIC MEDICAL CONDITIONS  FACILITY:  [facility] [[id number]]             CHIEF COMPLAIN/REASON FOR VISIT:   Chief Complaint   Patient presents with     Review Of Multiple Medical Conditions     HISTORY OF PRESENT ILLNESS: Ezekiel Chi is a 89 y.o. male is being seen today in the long term care facility for follow up and management of multiple chronic medical conditions.   He has Alzheimer's Dementia with psychosis. He does still have episodes of anxiety related to his Alzheimer's dementia. He's been on Risperdal 0.25 mg TID and staff has requested an increase in his medication secondarily to his episodes of anxiety usually inthe late afternoon and evening.    He does have a history of frequent falls.  Last fall in February 10, 2016.  while trying to self transfer.  Resident was found in a seated position on the floor by nursing staff. Resident had attempted to get out of bed without assistance. He stated that he wanted to see his wife. Resident denied hitting his head during the fall.  Patient does require a mechanical lift for all transfers and one was used to get patient back in bed.    Today, patient was seen in his wheelchair in his room sitting in the doorway, per usual.  Resident requires assistance of 1 with bed mobility and transfers using a stand lift. He is completely immobile and is chair bound with ROHO cushion. Patient's last fall was in October of last year. Resident is turned and repositioned q 2 hours to maintain skin integrity. He has bilateral grab bars to assist with bed mobility. He denies any uncontrolled pain but does have Tylenol 650 mg prn.    Past Medical History:   Diagnosis Date     Abnormal posture      Adult failure to thrive      Alzheimer Disease Late Onset     Created by Conversion       Anxiety      Arthritis      Cardiac dysrhythmia, unspecified      Coronary artery disease      Coronary Artery Disease     Created by Conversion      Coronary atherosclerosis of unspecified type of vessel, native or graft      Dementia of the Alzheimer's type 7/13/2014     Depression with anxiety 7/29/2014     Difficulty in walking      Encephalopathy acute 7/29/2014     Esophageal reflux     Created by Conversion      Factor V Leiden Mutation     Created by Conversion [facility] [facility] Annotation: Oct 28 2008  2:00PM - Corey Moser: heterozygous      Fatigue     Created by Conversion      Fever 7/28/2014     Hyperlipidemia     Created by Conversion      Major Depression, Single Episode     Created by Conversion      Mood disorder in conditions classified elsewhere 7/29/2014     Muscle weakness (generalized)      Muscle Weakness Generalized     Created by Conversion      Myocardial infarction      Osteoarthritis of knee      Osteoarthritis Of The Knee     Created by Conversion      Other protein-calorie malnutrition      Pressure ulcer, heel      PVD (peripheral vascular disease)      Right Bundle Branch Block With Left Anterior Fascicular Block     Created by Conversion      Sebaceous cyst     Created by Conversion      Sleep Apnea     Created by Conversion      Sleep apnea      Urinary Frequency More Than Twice At Night (Nocturia)     Created by Conversion      Past Surgical History:   Procedure Laterality Date     CARDIAC SURGERY       Social History     Social History     Marital status:      Spouse name: N/A     Number of children: N/A     Years of education: N/A     Occupational History     Not on file.     Social History Main Topics     Smoking status: Former Smoker     Quit date: 7/28/1960     Smokeless tobacco: Never Used     Alcohol use No     Drug use: No     Sexual activity: No     Other Topics Concern     Not on file     Social History Narrative     No narrative on file       ALLERGY: No Known  Allergies    DIET: Regular    MEDICATIONS:     Current Outpatient Prescriptions   Medication Sig     acetaminophen (TYLENOL) 325 MG tablet Take 650 mg by mouth every 6 (six) hours as needed for pain (Don't exceed 4,000mg acetaminophen in 24 hours).      aspirin 81 MG tablet Take 81 mg by mouth daily. Don't crush or swallow.     bisacodyl (DULCOLAX) 10 mg suppository Insert 10 mg into the rectum every third day as needed.      dextromethorphan-guaiFENesin (ROBITUSSIN-DM)  mg/5 mL liquid Take 10 mL by mouth every 4 (four) hours.     furosemide (LASIX) 20 MG tablet Take 20 mg by mouth daily.      ipratropium-albuterol (DUO-NEB) 0.5-2.5 mg/3 mL nebulizer 3 mL 4 (four) times a day as needed. (starting 2/10)     loperamide (IMODIUM) 2 mg capsule Take 1-2 mg by mouth 4 (four) times a day as needed for diarrhea.      menthol-zinc oxide (CALMOSEPTINE) 0.44-20.6 % Oint ointment Apply 1 application topically 3 (three) times a day as needed.     menthol-zinc oxide (CALMOSEPTINE) 0.44-20.6 % Oint ointment Apply 1 application topically 3 (three) times a day.     mirtazapine (REMERON) 15 MG tablet TAKE ONE TABLET BY MOUTH EVERY NIGHT AT BEDTIME     multivitamin (MULTIVITAMIN) per tablet Take 1 tablet by mouth daily.     nitroglycerin (NITROSTAT) 0.4 MG SL tablet Place 0.4 mg under the tongue every 5 (five) minutes as needed for chest pain (Dissolve under tongue every 5 min up to 3 doses for chest pain as needed. Don't chew or swallow.).      risperiDONE (RISPERDAL) 0.25 MG tablet TAKE 1 TABLET BY MOUTH  DAILY AT NOON & 2 TABLETS EVERY MORNING & AT BEDTIME (Patient taking differently: Take 0.25 mg by mouth daily. TAKE 1 TABLET BY MOUTH  DAILY AT NOON & 2 TABLETS EVERY MORNING & AT BEDTIME)     risperiDONE (RISPERDAL) 0.5 MG tablet Take 0.5 mg by mouth 2 (two) times a day.     senna-docusate (SENNOSIDES-DOCUSATE SODIUM) 8.6-50 mg tablet Take 1 tablet by mouth daily.      sodium fluoride (DENTAGEL) 1.1 % Gel dental gel Apply 1  application to teeth bedtime.     wound dressings (TRIAD WOUND DRESSING) Pste Apply 1 application topically every 8 (eight) hours. Apply to coccyyx     REVIEW OF SYSTEMS    Currently has no fever, chills, or rigors.  He does not have any visual problems but is hard of hearing. He denies any chest pain, headaches, palpitations, lightheadedness, dizziness,  shortness of breath, or cough.  His appetite is good, he denies any GERD symptoms, he denies any difficulty with swallowing, nausea, or vomiting.  He denies any abdominal pain, diarrhea or constipation. He denies any urinary symptoms. No insomnia.  No active bleeding.  No rash.  He does have chronic bilateral lower extremity edema.    PHYSICAL EXAMINATION:    Vitals:    08/30/17 2015   BP: 115/58   Pulse: (!) 58   Resp: 16   Temp: 97.2  F (36.2  C)   SpO2: 93%   Weight: 219 lb (99.3 kg)     HEENT:Normocephalic/atraumatic Conjunctivae without erythema nares are clear of any drainage.  Neck: No adenopathy JVD thyromegaly  Resp: Clear No rhonchi wheezing rales  Cardio: Regular rate and rhythm 2/6 murmur appreciated  Abdomen: Soft nontender no masses distention bowel sounds are present.  Extremities: 1+ lower extremity edema fair peripheral pulses  Skin: Skin intact No noted skin issues  : N/A  Musculoskeletal: Age-related degenerative joint disease  Psych: Alert oriented to self only    LABS:      Lab Results   Component Value Date    WBC 10.2 03/16/2017    HGB 14.0 03/16/2017    HCT 42.0 03/16/2017    MCV 91 03/16/2017     03/16/2017     Results for orders placed or performed in visit on 07/17/17   Basic Metabolic Panel   Result Value Ref Range    Sodium 139 136 - 145 mmol/L    Potassium 4.3 3.5 - 5.0 mmol/L    Chloride 102 98 - 107 mmol/L    CO2 28 22 - 31 mmol/L    Anion Gap, Calculation 9 5 - 18 mmol/L    Glucose 87 70 - 125 mg/dL    Calcium 8.8 8.5 - 10.5 mg/dL    BUN 13 8 - 28 mg/dL    Creatinine 0.80 0.70 - 1.30 mg/dL    GFR MDRD Af Amer >60 >60  mL/min/1.73m2    GFR MDRD Non Af Amer >60 >60 mL/min/1.73m2       Lab Results   Component Value Date    [id number] 429 03/16/2017     Vitamin D, Total (25-Hydroxy)   Date Value Ref Range Status   06/12/2017 71.5 30.0 - 80.0 ng/mL Final     Lab Results   Component Value Date    TSH 2.58 03/16/2017     Lab Results   Component Value Date    ALBUMIN 3.0 (L) 01/19/2017       ASSESSMENT/PLAN:    1. Dementia with behavioral disturbance -Neurologically stable   2. Osteoarthritis Of The Knee - Wheelchair bound   3. Bilateral lower extremity edema - Stale on Lasix   4. Coronary Artery Disease - No overt signs or symptoms of decompensation   5. Hyperlipidemia - Stable   6. Essential hypertension with goal blood pressure less than 130/85 -  Blood pressure are within target range, will continue Lasix                CARE PLAN: The care plan has been reviewed and discussed with patient and nursing staff. No changes made at this time. We will continue to monitor.        Electronically signed by: Tee Nguyen CNP

## 2021-06-13 NOTE — PROGRESS NOTES
[facility]    DATE: 2017    NAME:  Ezekiel Chi             :  1925  MRN: [id number]    CODE STATUS:  DNR  VISIT TYPE: REVIEW OF MULTIPLE CHRONIC MEDICAL CONDITIONS  FACILITY:  [facility] [[id number]]             CHIEF COMPLAIN/REASON FOR VISIT:   Chief Complaint   Patient presents with     Review Of Multiple Medical Conditions     HISTORY OF PRESENT ILLNESS: Ezekiel Chi is a 89 y.o. male is being seen today in the long term care facility for follow up and management of multiple chronic medical conditions.   He has Alzheimer's Dementia with psychosis. He does still have episodes of anxiety related to his Alzheimer's dementia. He's been on Risperdal 0.25 mg TID and staff has requested an increase in his medication secondarily to his episodes of anxiety usually inthe late afternoon and evening.    He does have a history of frequent falls.  Last fall in February 10, 2016.  while trying to self transfer.  Resident was found in a seated position on the floor by nursing staff. Resident had attempted to get out of bed without assistance. He stated that he wanted to see his wife. Resident denied hitting his head during the fall.  Patient does require a mechanical lift for all transfers and one was used to get patient back in bed.    Today, patient was seen in his wheelchair in his room sitting in the doorway, per usual.  Resident requires assistance of 1 with bed mobility and transfers using a stand lift. He is completely immobile and is chair bound with ROHO cushion. Patient offers no specific complaints and nursing staff has  No concerns at this time.    Past Medical History:   Diagnosis Date     Abnormal posture      Adult failure to thrive      Alzheimer Disease Late Onset     Created by Conversion      Anxiety      Arthritis      Cardiac dysrhythmia, unspecified      Coronary artery disease      Coronary Artery Disease     Created by Conversion      Coronary  atherosclerosis of unspecified type of vessel, native or graft      Dementia of the Alzheimer's type 7/13/2014     Depression with anxiety 7/29/2014     Difficulty in walking      Encephalopathy acute 7/29/2014     Esophageal reflux     Created by Conversion      Factor V Leiden Mutation     Created by Conversion [facility] Annotation: Oct 28 2008  2:00PM - Ehsan Corey: heterozygous      Fatigue     Created by Conversion      Fever 7/28/2014     Hyperlipidemia     Created by Conversion      Major Depression, Single Episode     Created by Conversion      Mood disorder in conditions classified elsewhere 7/29/2014     Muscle weakness (generalized)      Muscle Weakness Generalized     Created by Conversion      Myocardial infarction      Osteoarthritis of knee      Osteoarthritis Of The Knee     Created by Conversion      Other protein-calorie malnutrition      Pressure ulcer, heel      PVD (peripheral vascular disease)      Right Bundle Branch Block With Left Anterior Fascicular Block     Created by Conversion      Sebaceous cyst     Created by Conversion      Sleep Apnea     Created by Conversion      Sleep apnea      Urinary Frequency More Than Twice At Night (Nocturia)     Created by Conversion      Past Surgical History:   Procedure Laterality Date     CARDIAC SURGERY       Social History     Social History     Marital status:      Spouse name: N/A     Number of children: N/A     Years of education: N/A     Occupational History     Not on file.     Social History Main Topics     Smoking status: Former Smoker     Quit date: 7/28/1960     Smokeless tobacco: Never Used     Alcohol use No     Drug use: No     Sexual activity: No     Other Topics Concern     Not on file     Social History Narrative     No narrative on file       ALLERGY: No Known Allergies    DIET: Regular    MEDICATIONS:     Current Outpatient Prescriptions   Medication Sig     acetaminophen (TYLENOL) 325 MG tablet Take 650 mg by mouth every  6 (six) hours as needed for pain (Don't exceed 4,000mg acetaminophen in 24 hours).      aspirin 81 MG tablet Take 81 mg by mouth daily. Don't crush or swallow.     bisacodyl (DULCOLAX) 10 mg suppository Insert 10 mg into the rectum every third day as needed.      dextromethorphan-guaiFENesin (ROBITUSSIN-DM)  mg/5 mL liquid Take 10 mL by mouth every 4 (four) hours.     furosemide (LASIX) 20 MG tablet Take 20 mg by mouth daily.      ipratropium-albuterol (DUO-NEB) 0.5-2.5 mg/3 mL nebulizer 3 mL 4 (four) times a day as needed. (starting 2/10)     loperamide (IMODIUM) 2 mg capsule Take 1-2 mg by mouth 4 (four) times a day as needed for diarrhea.      menthol-zinc oxide (CALMOSEPTINE) 0.44-20.6 % Oint ointment Apply 1 application topically 3 (three) times a day as needed.     menthol-zinc oxide (CALMOSEPTINE) 0.44-20.6 % Oint ointment Apply 1 application topically 3 (three) times a day.     mirtazapine (REMERON) 15 MG tablet TAKE ONE TABLET BY MOUTH EVERY NIGHT AT BEDTIME     multivitamin (MULTIVITAMIN) per tablet Take 1 tablet by mouth daily.     nitroglycerin (NITROSTAT) 0.4 MG SL tablet Place 0.4 mg under the tongue every 5 (five) minutes as needed for chest pain (Dissolve under tongue every 5 min up to 3 doses for chest pain as needed. Don't chew or swallow.).      risperiDONE (RISPERDAL) 0.25 MG tablet TAKE 1 TABLET BY MOUTH  DAILY AT NOON & 2 TABLETS EVERY MORNING & AT BEDTIME (Patient taking differently: Take 0.25 mg by mouth daily. TAKE 1 TABLET BY MOUTH  DAILY AT NOON & 2 TABLETS EVERY MORNING & AT BEDTIME)     risperiDONE (RISPERDAL) 0.5 MG tablet Take 0.5 mg by mouth 2 (two) times a day.     senna-docusate (SENNOSIDES-DOCUSATE SODIUM) 8.6-50 mg tablet Take 1 tablet by mouth daily.      sodium fluoride (DENTAGEL) 1.1 % Gel dental gel Apply 1 application to teeth bedtime.     wound dressings (TRIAD WOUND DRESSING) Pste Apply 1 application topically every 8 (eight) hours. Apply to coccyyx     REVIEW OF  SYSTEMS    Currently has no fever, chills, or rigors.  He does not have any visual problems but is hard of hearing. He denies any chest pain, headaches, palpitations, lightheadedness, dizziness,  shortness of breath, or cough.  His appetite is good, he denies any GERD symptoms, he denies any difficulty with swallowing, nausea, or vomiting.  He denies any abdominal pain, diarrhea or constipation. He denies any urinary symptoms. No insomnia.  No active bleeding.  No rash.  He does have chronic bilateral lower extremity edema.    PHYSICAL EXAMINATION:    Vitals:    10/07/17 0947   BP: 112/77   Pulse: 83   Resp: 16   Temp: 97.2  F (36.2  C)   SpO2: 97%   Weight: 219 lb 4.8 oz (99.5 kg)     HEENT:Normocephalic/atraumatic Conjunctivae without erythema nares are clear of any drainage.  Neck: No adenopathy JVD thyromegaly  Resp: Clear No rhonchi wheezing rales  Cardio: Regular rate and rhythm 2/6 murmur appreciated  Abdomen: Soft nontender no masses distention bowel sounds are present.  Extremities: 1+ lower extremity edema fair peripheral pulses  Skin: Skin intact No noted skin issues  : N/A  Musculoskeletal: Age-related degenerative joint disease  Psych: Alert oriented to self only    LABS:      Lab Results   Component Value Date    WBC 10.2 03/16/2017    HGB 14.0 03/16/2017    HCT 42.0 03/16/2017    MCV 91 03/16/2017     03/16/2017     Results for orders placed or performed in visit on 07/17/17   Basic Metabolic Panel   Result Value Ref Range    Sodium 139 136 - 145 mmol/L    Potassium 4.3 3.5 - 5.0 mmol/L    Chloride 102 98 - 107 mmol/L    CO2 28 22 - 31 mmol/L    Anion Gap, Calculation 9 5 - 18 mmol/L    Glucose 87 70 - 125 mg/dL    Calcium 8.8 8.5 - 10.5 mg/dL    BUN 13 8 - 28 mg/dL    Creatinine 0.80 0.70 - 1.30 mg/dL    GFR MDRD Af Amer >60 >60 mL/min/1.73m2    GFR MDRD Non Af Amer >60 >60 mL/min/1.73m2       Lab Results   Component Value Date    LDWHCPJT82 429 03/16/2017     Vitamin D, Total (25-Hydroxy)    Date Value Ref Range Status   06/12/2017 71.5 30.0 - 80.0 ng/mL Final     Lab Results   Component Value Date    TSH 2.58 03/16/2017     Lab Results   Component Value Date    ALBUMIN 3.0 (L) 01/19/2017       ASSESSMENT/PLAN:    1. Coronary Artery Disease - No overt signs or symptoms of decompensation, will continue ASA and Nitroglycerin   2. Depression with anxiety - Mood stable on Mirtazapine and Risperdal   3. Dementia with behavioral disturbance  - Neurologically stable on Risperdal   4. Muscle Weakness Generalized - Wheelchair bound   5. Bilateral lower extremity edema  - Stable on Lasix and compression wraps. He also keeps his feet elevated                      CARE PLAN: The care plan has been reviewed and discussed with patient and nursing staff. No changes made at this time. We will continue to monitor.        Electronically signed by: Tee Nguyen CNP

## 2021-06-13 NOTE — PROGRESS NOTES
Date: 2017    Name:  Ezekiel Chi  :  1925  MRN: [id number]  Code Status:  DNR    Visit Type: Review Of Multiple Medical Conditions     Facility:  [facility] [[id number]]  Facility Type:  (Long Term Care, LTC)    History of Present Illness:      This is a 89-year-old male here with Alzheimer's dementia and psychosis. His episodes of anxiety related to his Alzheimer's dementia has improved with increased Risperdal 0.05 mg twice daily, 0.25 mg daily.  His wife does visit him daily and she's very involved in his care.  He is also being monitored  dietitian for his malnutrition.  He has not had any fevers and cough, CP or SOB since his bout with influenza in 2017.  He has not had any falls since 2016.  He continues to need mechanical transfers and continues to have help with all his ADLs.  His appetite is good accg to staff.  Other ROS negative.    Past Medical History     Esophageal reflux      Right Bundle Branch Block With Left Anterior Fascicular Block      Urinary Frequency More Than Twice At Night (Nocturia)      Sleep Apnea      Sebaceous cyst      Factor V Leiden Mutation      Created by [employer] [facility] Annotation: Oct 28 2008  2:00PM - Corey Moser: heterozygous      Osteoarthritis Of The Knee      Hyperlipidemia      Dementia of the Alzheimer's type 2014     Mood disorder in conditions classified elsewhere 2014     Depression with anxiety 2014     Coronary atherosclerosis of unspecified type of vessel, native or graft      Pressure ulcer, heel      PVD (peripheral vascular disease)      Other protein-calorie malnutrition      Myocardial infarction      Anxiety      Adult failure to thrive      Cardiac dysrhythmia, unspecified      Abnormal posture      Sleep apnea      Osteoarthritis of knee      Past Surgical History:   Procedure Laterality Date     CARDIAC SURGERY         Medications: reviewed    PHYSICAL EXAM:  132/84, 97.8, 98, 18, 93%  This is an  alert male up in his wheelchair sitting by the door of his room watching people as they walked by.   HEENT:Normocephalic/atraumatic Conjunctivae without erythema nares are clear of any drainage.  Neck: No adenopathy JVD thyromegaly  Resp: Clear No rhonchi wheezing rales  Cardio: Regular rate and rhythm 2/6 murmur at base  Abdomen: Soft nontender no masses distention bowel sounds are present.  Extremities: 1+ lower extremity edema fair peripheral pulses  : N/A  Musculoskeletal: Age-related degenerative joint disease  Psych: Alert oriented to self only, follows simple commands      Labs:  reivewed    Diagnoses/Plan  1. Dementia of the Alzheimer's type     2. Coronary Artery Disease     3. Depression with anxiety     4. Essential hypertension with goal blood pressure less than 130/85       1. Dementia of the Alzheimer's type  No behavioral changes noted after increasing risperdal,  wife has not noted any further decline in his cognitive status    2. Essential hypertension with goal blood pressure less than 140/90  stable   3. Bilateral lower extremity edema  Stable on furosemide, BMP on 3/17 is normal with GFR greater than 60.  Recheck BMP   4. Coronary Artery Disease  Stable on ASA   5. Depression with anxiety  stable on Mirtazepine           Electronically signed by: Shayna Edwards MD         ------

## 2021-06-14 NOTE — PROGRESS NOTES
[facility]    DATE: 2017    NAME:  Ezekiel Chi             :  1925  MRN: [id number]    CODE STATUS:  DNR  VISIT TYPE: REVIEW OF MULTIPLE CHRONIC MEDICAL CONDITIONS  FACILITY:  [facility] [[id number]]             CHIEF COMPLAIN/REASON FOR VISIT:   Chief Complaint   Patient presents with     Review Of Multiple Medical Conditions     HISTORY OF PRESENT ILLNESS: Ezekiel Chi is a 89 y.o. male is being seen today in the long term care facility for follow up and management of multiple chronic medical conditions.   He has Alzheimer's Dementia with psychosis. He does still have episodes of anxiety related to his Alzheimer's dementia. He's been on Risperdal 0.25 mg TID and staff has requested an increase in his medication secondarily to his episodes of anxiety usually inthe late afternoon and evening.    He does have a history of frequent falls.  Last fall in February 10, 2016.  while trying to self transfer.  Resident was found in a seated position on the floor by nursing staff. Resident had attempted to get out of bed without assistance. He stated that he wanted to see his wife. Resident denied hitting his head during the fall.  Patient does require a mechanical lift for all transfers and one was used to get patient back in bed.    Today, patient was seen in his wheelchair in his room sitting in the doorway, per usual.  Nursing staff has no concerns at this time.  Patient denies any pain and when asked he denies any insomnia, diarrhea, or constipation.    Past Medical History:   Diagnosis Date     Abnormal posture      Adult failure to thrive      Alzheimer Disease Late Onset     Created by Conversion      Anxiety      Arthritis      Cardiac dysrhythmia, unspecified      Coronary artery disease      Coronary Artery Disease     Created by Conversion      Coronary atherosclerosis of unspecified type of vessel, native or graft      Dementia of the Alzheimer's type  7/13/2014     Depression with anxiety 7/29/2014     Difficulty in walking      Encephalopathy acute 7/29/2014     Esophageal reflux     Created by Conversion      Factor V Leiden Mutation     Created by Conversion [facility] Annotation: Oct 28 2008  2:00PM - Corey Moser: heterozygous      Fatigue     Created by Conversion      Fever 7/28/2014     Hyperlipidemia     Created by Conversion      Major Depression, Single Episode     Created by Conversion      Mood disorder in conditions classified elsewhere 7/29/2014     Muscle weakness (generalized)      Muscle Weakness Generalized     Created by Conversion      Myocardial infarction      Osteoarthritis of knee      Osteoarthritis Of The Knee     Created by Conversion      Other protein-calorie malnutrition      Pressure ulcer, heel      PVD (peripheral vascular disease)      Right Bundle Branch Block With Left Anterior Fascicular Block     Created by Conversion      Sebaceous cyst     Created by Conversion      Sleep Apnea     Created by Conversion      Sleep apnea      Urinary Frequency More Than Twice At Night (Nocturia)     Created by Conversion      Past Surgical History:   Procedure Laterality Date     CARDIAC SURGERY       Social History     Social History     Marital status:      Spouse name: N/A     Number of children: N/A     Years of education: N/A     Occupational History     Not on file.     Social History Main Topics     Smoking status: Former Smoker     Quit date: 7/28/1960     Smokeless tobacco: Never Used     Alcohol use No     Drug use: No     Sexual activity: No     Other Topics Concern     Not on file     Social History Narrative     No narrative on file       ALLERGY: No Known Allergies    DIET: Regular    MEDICATIONS:     Current Outpatient Prescriptions   Medication Sig     acetaminophen (TYLENOL) 325 MG tablet Take 650 mg by mouth every 6 (six) hours as needed for pain (Don't exceed 4,000mg acetaminophen in 24 hours).      aspirin 81 MG  tablet Take 81 mg by mouth daily. Don't crush or swallow.     bisacodyl (DULCOLAX) 10 mg suppository Insert 10 mg into the rectum every third day as needed.      dextromethorphan-guaiFENesin (ROBITUSSIN-DM)  mg/5 mL liquid Take 10 mL by mouth every 4 (four) hours.     furosemide (LASIX) 20 MG tablet Take 20 mg by mouth daily.      ipratropium-albuterol (DUO-NEB) 0.5-2.5 mg/3 mL nebulizer 3 mL 4 (four) times a day as needed. (starting 2/10)     loperamide (IMODIUM) 2 mg capsule Take 1-2 mg by mouth 4 (four) times a day as needed for diarrhea.      menthol-zinc oxide (CALMOSEPTINE) 0.44-20.6 % Oint ointment Apply 1 application topically 3 (three) times a day as needed.     menthol-zinc oxide (CALMOSEPTINE) 0.44-20.6 % Oint ointment Apply 1 application topically 3 (three) times a day.     mirtazapine (REMERON) 15 MG tablet TAKE ONE TABLET BY MOUTH EVERY NIGHT AT BEDTIME     multivitamin (MULTIVITAMIN) per tablet Take 1 tablet by mouth daily.     nitroglycerin (NITROSTAT) 0.4 MG SL tablet Place 0.4 mg under the tongue every 5 (five) minutes as needed for chest pain (Dissolve under tongue every 5 min up to 3 doses for chest pain as needed. Don't chew or swallow.).      risperiDONE (RISPERDAL) 0.25 MG tablet TAKE 1 TABLET BY MOUTH  DAILY AT NOON & 2 TABLETS EVERY MORNING & AT BEDTIME (Patient taking differently: Take 0.25 mg by mouth daily. TAKE 1 TABLET BY MOUTH  DAILY AT NOON & 2 TABLETS EVERY MORNING & AT BEDTIME)     risperiDONE (RISPERDAL) 0.5 MG tablet Take 0.5 mg by mouth 2 (two) times a day.     senna-docusate (SENNOSIDES-DOCUSATE SODIUM) 8.6-50 mg tablet Take 1 tablet by mouth daily.      sodium fluoride (DENTAGEL) 1.1 % Gel dental gel Apply 1 application to teeth bedtime.     wound dressings (TRIAD WOUND DRESSING) Pste Apply 1 application topically every 8 (eight) hours. Apply to coccyyx     REVIEW OF SYSTEMS    Currently has no fever, chills, or rigors.  He does not have any visual problems but is hard of  hearing. He denies any chest pain, headaches, palpitations, lightheadedness, dizziness,  shortness of breath, or cough.  His appetite is good, he denies any GERD symptoms, he denies any difficulty with swallowing, nausea, or vomiting.  He denies any abdominal pain, diarrhea or constipation. He denies any urinary symptoms. No insomnia.  No active bleeding.  No rash.  He does have chronic bilateral lower extremity edema.    PHYSICAL EXAMINATION:    Vitals:    11/13/17 2119   BP: 126/78   Pulse: 78   Resp: 18   Temp: 98.2  F (36.8  C)   SpO2: 90%   Weight: (!) 225 lb 6.4 oz (102.2 kg)     HEENT:Normocephalic/atraumatic Conjunctivae without erythema nares are clear of any drainage.  Neck: No adenopathy JVD thyromegaly  Resp: Clear No rhonchi wheezing rales  Cardio: Regular rate and rhythm 2/6 murmur appreciated  Abdomen: Soft nontender no masses distention bowel sounds are present.  Extremities: 1+ lower extremity edema fair peripheral pulses  Skin: Skin intact No noted skin issues  : N/A  Musculoskeletal: Age-related degenerative joint disease  Psych: Alert oriented to self only    LABS:      Lab Results   Component Value Date    WBC 10.2 03/16/2017    HGB 14.0 03/16/2017    HCT 42.0 03/16/2017    MCV 91 03/16/2017     03/16/2017     Results for orders placed or performed in visit on 11/07/17   Basic Metabolic Panel   Result Value Ref Range    Sodium 138 136 - 145 mmol/L    Potassium 4.4 3.5 - 5.0 mmol/L    Chloride 103 98 - 107 mmol/L    CO2 27 22 - 31 mmol/L    Anion Gap, Calculation 8 5 - 18 mmol/L    Glucose 92 70 - 125 mg/dL    Calcium 8.6 8.5 - 10.5 mg/dL    BUN 13 8 - 28 mg/dL    Creatinine 0.80 0.70 - 1.30 mg/dL    GFR MDRD Af Amer >60 >60 mL/min/1.73m2    GFR MDRD Non Af Amer >60 >60 mL/min/1.73m2       Lab Results   Component Value Date    LLIRNPOW21 429 03/16/2017     Vitamin D, Total (25-Hydroxy)   Date Value Ref Range Status   06/12/2017 71.5 30.0 - 80.0 ng/mL Final     Lab Results   Component  Value Date    TSH 2.58 03/16/2017     Lab Results   Component Value Date    ALBUMIN 3.0 (L) 01/19/2017       ASSESSMENT/PLAN:    1. Dementia of the Alzheimer's type   - Neurologically stable on Risperdal without any behaviors   2. Hyperlipidemia - Sable   3. Gastroesophageal reflux disease without esophagitis - Stable   4. Muscle Weakness Generalized - Wheelchair bound   5. Bilateral lower extremity edema  - Stable on Lasix and compression wraps. He also keeps his feet elevated                      CARE PLAN: The care plan has been reviewed and discussed with patient and nursing staff. No changes made at this time. We will continue to monitor.        Electronically signed by: Tee Nguyen CNP

## 2021-06-14 NOTE — PROGRESS NOTES
[facility]    DATE: 2017    NAME:  Ezekiel Chi             :  1925  MRN: [id number]    CODE STATUS:  DNR  VISIT TYPE: REVIEW OF MULTIPLE CHRONIC MEDICAL CONDITIONS  FACILITY:  [facility] [[id number]]             CHIEF COMPLAIN/REASON FOR VISIT:   Chief Complaint   Patient presents with     Review Of Multiple Medical Conditions     HISTORY OF PRESENT ILLNESS: Ezekiel Chi is a 89 y.o. male is being seen today in the long term care facility for follow up and management of multiple chronic medical conditions.   He has Alzheimer's Dementia with psychosis. He does still have episodes of anxiety related to his Alzheimer's dementia. He's been on Risperdal 0.25 mg TID and staff has requested an increase in his medication secondarily to his episodes of anxiety usually inthe late afternoon and evening.    He does have a history of frequent falls.  Last fall in February 10, 2016.  while trying to self transfer.  Resident was found in a seated position on the floor by nursing staff. Resident had attempted to get out of bed without assistance. He stated that he wanted to see his wife. Resident denied hitting his head during the fall.  Patient does require a mechanical lift for all transfers and one was used to get patient back in bed.    Today, patient was seen in his wheelchair in his room sitting in the doorway, per usual. Patient denies any pain.  His wife comes to visit daily at supper time.  He hasn't had any recent falls and depends on staff for all of his  ADLs.     Past Medical History:   Diagnosis Date     Abnormal posture      Adult failure to thrive      Alzheimer Disease Late Onset     Created by Conversion      Anxiety      Arthritis      Cardiac dysrhythmia, unspecified      Coronary artery disease      Coronary Artery Disease     Created by Conversion      Coronary atherosclerosis of unspecified type of vessel, native or graft      Dementia of the  Alzheimer's type 7/13/2014     Depression with anxiety 7/29/2014     Difficulty in walking      Encephalopathy acute 7/29/2014     Esophageal reflux     Created by Conversion      Factor V Leiden Mutation     Created by Conversion [facility] Annotation: Oct 28 2008  2:00PM - Corey Moser: heterozygous      Fatigue     Created by Conversion      Fever 7/28/2014     Hyperlipidemia     Created by Conversion      Major Depression, Single Episode     Created by Conversion      Mood disorder in conditions classified elsewhere 7/29/2014     Muscle weakness (generalized)      Muscle Weakness Generalized     Created by Conversion      Myocardial infarction      Osteoarthritis of knee      Osteoarthritis Of The Knee     Created by Conversion      Other protein-calorie malnutrition      Pressure ulcer, heel      PVD (peripheral vascular disease)      Right Bundle Branch Block With Left Anterior Fascicular Block     Created by Conversion      Sebaceous cyst     Created by Conversion      Sleep Apnea     Created by Conversion      Sleep apnea      Urinary Frequency More Than Twice At Night (Nocturia)     Created by Conversion      Past Surgical History:   Procedure Laterality Date     CARDIAC SURGERY       Social History     Social History     Marital status:      Spouse name: N/A     Number of children: N/A     Years of education: N/A     Occupational History     Not on file.     Social History Main Topics     Smoking status: Former Smoker     Quit date: 7/28/1960     Smokeless tobacco: Never Used     Alcohol use No     Drug use: No     Sexual activity: No     Other Topics Concern     Not on file     Social History Narrative     No narrative on file       ALLERGY: No Known Allergies    DIET: Regular    MEDICATIONS:     Current Outpatient Prescriptions   Medication Sig     acetaminophen (TYLENOL) 325 MG tablet Take 650 mg by mouth every 6 (six) hours as needed for pain (Don't exceed 4,000mg acetaminophen in 24 hours).       aspirin 81 MG tablet Take 81 mg by mouth daily. Don't crush or swallow.     bisacodyl (DULCOLAX) 10 mg suppository Insert 10 mg into the rectum every third day as needed.      dextromethorphan-guaiFENesin (ROBITUSSIN-DM)  mg/5 mL liquid Take 10 mL by mouth every 4 (four) hours.     furosemide (LASIX) 20 MG tablet Take 20 mg by mouth daily.      ipratropium-albuterol (DUO-NEB) 0.5-2.5 mg/3 mL nebulizer 3 mL 4 (four) times a day as needed. (starting 2/10)     loperamide (IMODIUM) 2 mg capsule Take 1-2 mg by mouth 4 (four) times a day as needed for diarrhea.      menthol-zinc oxide (CALMOSEPTINE) 0.44-20.6 % Oint ointment Apply 1 application topically 3 (three) times a day as needed.     menthol-zinc oxide (CALMOSEPTINE) 0.44-20.6 % Oint ointment Apply 1 application topically 3 (three) times a day.     mirtazapine (REMERON) 15 MG tablet TAKE ONE TABLET BY MOUTH EVERY NIGHT AT BEDTIME     multivitamin (MULTIVITAMIN) per tablet Take 1 tablet by mouth daily.     nitroglycerin (NITROSTAT) 0.4 MG SL tablet Place 0.4 mg under the tongue every 5 (five) minutes as needed for chest pain (Dissolve under tongue every 5 min up to 3 doses for chest pain as needed. Don't chew or swallow.).      risperiDONE (RISPERDAL) 0.25 MG tablet TAKE 1 TABLET BY MOUTH  DAILY AT NOON & 2 TABLETS EVERY MORNING & AT BEDTIME (Patient taking differently: Take 0.25 mg by mouth daily. TAKE 1 TABLET BY MOUTH  DAILY AT NOON & 2 TABLETS EVERY MORNING & AT BEDTIME)     risperiDONE (RISPERDAL) 0.5 MG tablet Take 0.5 mg by mouth 2 (two) times a day.     senna-docusate (SENNOSIDES-DOCUSATE SODIUM) 8.6-50 mg tablet Take 1 tablet by mouth daily.      sodium fluoride (DENTAGEL) 1.1 % Gel dental gel Apply 1 application to teeth bedtime.     wound dressings (TRIAD WOUND DRESSING) Pste Apply 1 application topically every 8 (eight) hours. Apply to coccyyx     REVIEW OF SYSTEMS    Currently has no fever, chills, or rigors.  He does not have any visual  problems but is hard of hearing. He denies any chest pain, headaches, palpitations, lightheadedness, dizziness,  shortness of breath, or cough.  His appetite is good, he denies any GERD symptoms, he denies any difficulty with swallowing, nausea, or vomiting.  He denies any abdominal pain, diarrhea or constipation. He denies any urinary symptoms. No insomnia.  No active bleeding.  No rash.  He does have chronic bilateral lower extremity edema.    PHYSICAL EXAMINATION:    Vitals:    12/18/17 2331   BP: 132/68   Pulse: 76   Resp: 16   Temp: 97.1  F (36.2  C)   SpO2: 96%   Weight: (!) 227 lb (103 kg)     HEENT:Normocephalic/atraumatic Conjunctivae without erythema nares are clear of any drainage.  Neck: No adenopathy JVD thyromegaly  Resp: Clear No rhonchi wheezing rales  Cardio: Regular rate and rhythm 2/6 murmur appreciated  Abdomen: Soft nontender no masses distention bowel sounds are present.  Extremities: 1+ lower extremity edema fair peripheral pulses  Skin: Skin intact No noted skin issues  : N/A  Musculoskeletal: Age-related degenerative joint disease  Psych: Alert oriented to self only    LABS:      Lab Results   Component Value Date    WBC 10.2 03/16/2017    HGB 14.0 03/16/2017    HCT 42.0 03/16/2017    MCV 91 03/16/2017     03/16/2017     Results for orders placed or performed in visit on 11/07/17   Basic Metabolic Panel   Result Value Ref Range    Sodium 138 136 - 145 mmol/L    Potassium 4.4 3.5 - 5.0 mmol/L    Chloride 103 98 - 107 mmol/L    CO2 27 22 - 31 mmol/L    Anion Gap, Calculation 8 5 - 18 mmol/L    Glucose 92 70 - 125 mg/dL    Calcium 8.6 8.5 - 10.5 mg/dL    BUN 13 8 - 28 mg/dL    Creatinine 0.80 0.70 - 1.30 mg/dL    GFR MDRD Af Amer >60 >60 mL/min/1.73m2    GFR MDRD Non Af Amer >60 >60 mL/min/1.73m2       Lab Results   Component Value Date    [id number] 429 03/16/2017     Vitamin D, Total (25-Hydroxy)   Date Value Ref Range Status   06/12/2017 71.5 30.0 - 80.0 ng/mL Final     Lab Results    Component Value Date    TSH 2.58 03/16/2017     Lab Results   Component Value Date    ALBUMIN 3.0 (L) 01/19/2017       ASSESSMENT/PLAN:    1. Depression with anxiety - Controlled with Mirtazapine and Risperidone   2. Muscle Weakness Generalized - Wheelchair bound   3. Coronary Artery Disease - No overt signs or symptoms of decompensation, will continue Nitro   4. Hyperlipidemia - Stable, not on any medications at this time   5. Dementia of the Alzheimer's type   - Neurologically stable on Risperidone and Mirtazapine without any behaviors   6. Bilateral lower extremity edema - Stable with compression wraps, feet elevation, and Lasix                           CARE PLAN: The care plan has been reviewed and discussed with patient and nursing staff. No changes made at this time. We will continue to monitor.        Electronically signed by: Tee Nguyen CNP

## 2021-06-15 NOTE — PROGRESS NOTES
[facility]    Facility:   [facility] [[id number]]   Code Status: DNR/DNI      CHIEF COMPLAINT/REASON FOR VISIT:  Chief Complaint   Patient presents with     Problem Visit     rhinnorhea, cough       HISTORY:      HPI: Ezekiel is a 89 y.o. male whom I was asked to see by the nursing staff due to his runny nose and cough.  He does have underlying dementia so it is difficult to get any accurate review of systems, but he states he is not feeling any problem with breathing nor the coughing.  He complains of no other problem.  Vital signs do have concern in terms of rapid respiratory rate and pulse.  However there is no report of productive sputum that has color to it.  No change in his behavior noted.    Past Medical History:   Diagnosis Date     Abnormal posture      Adult failure to thrive      Alzheimer Disease Late Onset     Created by Conversion      Anxiety      Arthritis      Cardiac dysrhythmia, unspecified      Coronary artery disease      Coronary Artery Disease     Created by Conversion      Coronary atherosclerosis of unspecified type of vessel, native or graft      Dementia of the Alzheimer's type 7/13/2014     Depression with anxiety 7/29/2014     Difficulty in walking      Encephalopathy acute 7/29/2014     Esophageal reflux     Created by Conversion      Factor V Leiden Mutation     Created by Conversion [facility] Annotation: Oct 28 2008  2:00PM - Corey Moser: heterozygous      Fatigue     Created by Conversion      Fever 7/28/2014     Hyperlipidemia     Created by Conversion      Major Depression, Single Episode     Created by Conversion      Mood disorder in conditions classified elsewhere 7/29/2014     Muscle weakness (generalized)      Muscle Weakness Generalized     Created by Conversion      Myocardial infarction      Osteoarthritis of knee      Osteoarthritis Of The Knee     Created by Conversion      Other protein-calorie malnutrition      Pressure ulcer, heel       PVD (peripheral vascular disease)      Right Bundle Branch Block With Left Anterior Fascicular Block     Created by Conversion      Sebaceous cyst     Created by Conversion      Sleep Apnea     Created by Conversion      Sleep apnea      Urinary Frequency More Than Twice At Night (Nocturia)     Created by Conversion              Family History   Problem Relation Age of Onset     Cancer Mother      Cancer Sister      Heart disease Sister      Heart disease Brother      Social History     Social History     Marital status:      Spouse name: N/A     Number of children: N/A     Years of education: N/A     Social History Main Topics     Smoking status: Former Smoker     Quit date: 7/28/1960     Smokeless tobacco: Never Used     Alcohol use No     Drug use: No     Sexual activity: No     Other Topics Concern     Not on file     Social History Narrative     No narrative on file         Review of Systems   Unable to perform ROS: Dementia       .  Vitals:    12/21/17 1455   BP: 119/72   Pulse: (!) 112   Resp: 22   Temp: 97.7  F (36.5  C)   SpO2: 92%   Weight: (!) 227 lb (103 kg)       Physical Exam   Constitutional: No distress.   He is sitting in his Jossy chair out by the hallway   HENT:   Mouth/Throat: Oropharynx is clear and moist.   Eyes: Right eye exhibits no discharge. Left eye exhibits no discharge.   Cardiovascular: Normal heart sounds.    Pulmonary/Chest: Effort normal and breath sounds normal.   Lymphadenopathy:     He has no cervical adenopathy.   Neurological: He is alert.   Psychiatric: He has a normal mood and affect.   Nursing note and vitals reviewed.        LABS:   No recent laboratory testing    ASSESSMENT:      ICD-10-CM    1. Upper respiratory tract infection, unspecified type J06.9    2. Muscle Weakness Generalized M62.81    3. Alzheimer's dementia without behavioral disturbance, unspecified timing of dementia onset G30.9     F02.80        PLAN:    It is my assessment that this is a URI rather  than sign of lower respiratory infection and that further observation is the best approach at this point.  Also I discussed with nursing that I do not feel he is experiencing influenza.      Electronically signed by: Francisco Scruggs MD

## 2021-06-15 NOTE — PROGRESS NOTES
[facility]    DATE: 2018    NAME:  Ezekiel Chi             :  1925  MRN: [id number]    CODE STATUS:  DNR  VISIT TYPE: REVIEW OF MULTIPLE CHRONIC MEDICAL CONDITIONS  FACILITY:  [facility] [[id number]]             CHIEF COMPLAIN/REASON FOR VISIT:   Chief Complaint   Patient presents with     Review Of Multiple Medical Conditions     HISTORY OF PRESENT ILLNESS: Ezekiel Chi is a 89 y.o. male is being seen today in the long term care facility for follow up and management of multiple chronic medical conditions.   He has Alzheimer's Dementia with psychosis. He does still have episodes of anxiety related to his Alzheimer's dementia. He's been on Risperdal 0.25 mg TID and staff has requested an increase in his medication secondarily to his episodes of anxiety usually inthe late afternoon and evening.    He does have a history of frequent falls.  Last fall in February 10, 2016.  while trying to self transfer.  Resident was found in a seated position on the floor by nursing staff. Resident had attempted to get out of bed without assistance. He stated that he wanted to see his wife. Resident denied hitting his head during the fall.  Patient does require a mechanical lift for all transfers and one was used to get patient back in bed.    Today, patient was seen in his wheelchair in his room sitting in the doorway, per usual. Staff reports progressive weakness.  He is now assist of 2 with ADLs and transfers from assist of 1.  He is no longer assisting with cares.  He continues to denies any pain.  Denies insomnia.  Normotensive.  He has difficulty swallowing pills.  He was noted to hold his medications in his mouth more this past weekend.    Past Medical History:   Diagnosis Date     Abnormal posture      Adult failure to thrive      Alzheimer Disease Late Onset     Created by Conversion      Anxiety      Arthritis      Cardiac dysrhythmia, unspecified      Coronary artery  disease      Coronary Artery Disease     Created by Conversion      Coronary atherosclerosis of unspecified type of vessel, native or graft      Dementia of the Alzheimer's type 7/13/2014     Depression with anxiety 7/29/2014     Difficulty in walking      Encephalopathy acute 7/29/2014     Esophageal reflux     Created by Conversion      Factor V Leiden Mutation     Created by Conversion [facility] Annotation: Oct 28 2008  2:00PM - Corey Moser: heterozygous      Fatigue     Created by Conversion      Fever 7/28/2014     Hyperlipidemia     Created by Conversion      Major Depression, Single Episode     Created by Conversion      Mood disorder in conditions classified elsewhere 7/29/2014     Muscle weakness (generalized)      Muscle Weakness Generalized     Created by Conversion      Myocardial infarction      Osteoarthritis of knee      Osteoarthritis Of The Knee     Created by Conversion      Other protein-calorie malnutrition      Pressure ulcer, heel      PVD (peripheral vascular disease)      Right Bundle Branch Block With Left Anterior Fascicular Block     Created by Conversion      Sebaceous cyst     Created by Conversion      Sleep Apnea     Created by Conversion      Sleep apnea      Urinary Frequency More Than Twice At Night (Nocturia)     Created by Conversion      Past Surgical History:   Procedure Laterality Date     CARDIAC SURGERY       Social History     Social History     Marital status:      Spouse name: N/A     Number of children: N/A     Years of education: N/A     Occupational History     Not on file.     Social History Main Topics     Smoking status: Former Smoker     Quit date: 7/28/1960     Smokeless tobacco: Never Used     Alcohol use No     Drug use: No     Sexual activity: No     Other Topics Concern     Not on file     Social History Narrative     No narrative on file       ALLERGY: No Known Allergies    DIET: Regular    MEDICATIONS:     Current Outpatient Prescriptions    Medication Sig     acetaminophen (TYLENOL) 325 MG tablet Take 650 mg by mouth every 6 (six) hours as needed for pain (Don't exceed 4,000mg acetaminophen in 24 hours).      aspirin 81 MG tablet Take 81 mg by mouth daily. Don't crush or swallow.     bisacodyl (DULCOLAX) 10 mg suppository Insert 10 mg into the rectum every third day as needed.      dextromethorphan-guaiFENesin (ROBITUSSIN-DM)  mg/5 mL liquid Take 10 mL by mouth every 4 (four) hours.     furosemide (LASIX) 20 MG tablet Take 20 mg by mouth daily.      ipratropium-albuterol (DUO-NEB) 0.5-2.5 mg/3 mL nebulizer 3 mL 4 (four) times a day as needed. (starting 2/10)     loperamide (IMODIUM) 2 mg capsule Take 1-2 mg by mouth 4 (four) times a day as needed for diarrhea.      menthol-zinc oxide (CALMOSEPTINE) 0.44-20.6 % Oint ointment Apply 1 application topically 3 (three) times a day as needed.     menthol-zinc oxide (CALMOSEPTINE) 0.44-20.6 % Oint ointment Apply 1 application topically 3 (three) times a day.     mirtazapine (REMERON) 15 MG tablet TAKE ONE TABLET BY MOUTH EVERY NIGHT AT BEDTIME     multivitamin (MULTIVITAMIN) per tablet Take 1 tablet by mouth daily.     nitroglycerin (NITROSTAT) 0.4 MG SL tablet Place 0.4 mg under the tongue every 5 (five) minutes as needed for chest pain (Dissolve under tongue every 5 min up to 3 doses for chest pain as needed. Don't chew or swallow.).      risperiDONE (RISPERDAL) 0.25 MG tablet TAKE 1 TABLET BY MOUTH  DAILY AT NOON & 2 TABLETS EVERY MORNING & AT BEDTIME (Patient taking differently: Take 0.25 mg by mouth daily. TAKE 1 TABLET BY MOUTH  DAILY AT NOON & 2 TABLETS EVERY MORNING & AT BEDTIME)     risperiDONE (RISPERDAL) 0.5 MG tablet Take 0.5 mg by mouth 2 (two) times a day.     senna-docusate (SENNOSIDES-DOCUSATE SODIUM) 8.6-50 mg tablet Take 1 tablet by mouth daily.      sodium fluoride (DENTAGEL) 1.1 % Gel dental gel Apply 1 application to teeth bedtime.     wound dressings (TRIAD WOUND DRESSING) Pste  Apply 1 application topically every 8 (eight) hours. Apply to coccyyx     REVIEW OF SYSTEMS    Currently has no fever, chills, or rigors.  He does not have any visual problems but is hard of hearing. He denies any chest pain, headaches, palpitations, lightheadedness, dizziness,  shortness of breath, or cough.  His appetite is good, he denies any GERD symptoms, he denies any difficulty with swallowing, nausea, or vomiting.  He denies any abdominal pain, diarrhea or constipation. He denies any urinary symptoms. No insomnia.  No active bleeding.  No rash.  He does have chronic bilateral lower extremity edema.    PHYSICAL EXAMINATION:    Vitals:    01/31/18 2303   BP: 127/57   Pulse: 68   Resp: 18   Temp: 97.5  F (36.4  C)   SpO2: 96%   Weight: (!) 229 lb 3.2 oz (104 kg)     HEENT:Normocephalic/atraumatic Conjunctivae without erythema nares are clear of any drainage.  Neck: No adenopathy JVD thyromegaly  Resp: Clear No rhonchi wheezing rales  Cardio: Regular rate and rhythm 2/6 murmur appreciated  Abdomen: Soft nontender no masses distention bowel sounds are present.  Extremities: 1+ lower extremity edema fair peripheral pulses  Skin: Skin intact No noted skin issues  : N/A  Musculoskeletal: Age-related degenerative joint disease  Psych: Alert oriented to self only    LABS:      Lab Results   Component Value Date    WBC 10.2 03/16/2017    HGB 14.0 03/16/2017    HCT 42.0 03/16/2017    MCV 91 03/16/2017     03/16/2017     Results for orders placed or performed in visit on 11/07/17   Basic Metabolic Panel   Result Value Ref Range    Sodium 138 136 - 145 mmol/L    Potassium 4.4 3.5 - 5.0 mmol/L    Chloride 103 98 - 107 mmol/L    CO2 27 22 - 31 mmol/L    Anion Gap, Calculation 8 5 - 18 mmol/L    Glucose 92 70 - 125 mg/dL    Calcium 8.6 8.5 - 10.5 mg/dL    BUN 13 8 - 28 mg/dL    Creatinine 0.80 0.70 - 1.30 mg/dL    GFR MDRD Af Amer >60 >60 mL/min/1.73m2    GFR MDRD Non Af Amer >60 >60 mL/min/1.73m2       Lab Results    Component Value Date    [id number] 429 03/16/2017     Vitamin D, Total (25-Hydroxy)   Date Value Ref Range Status   06/12/2017 71.5 30.0 - 80.0 ng/mL Final     Lab Results   Component Value Date    TSH 2.58 03/16/2017     Lab Results   Component Value Date    ALBUMIN 3.0 (L) 01/19/2017       ASSESSMENT/PLAN:    1. Dysphagia - At present, this only limited to medications so will crush all crushable medications.  Will continue to monitor   2. Muscle Weakness Generalized - Progressive   3. Alzheimer's dementia without behavioral disturbance, unspecified timing of dementia onset  - Neurologically stable on Risperidone and Mirtazapine without any behaviors   4. Depression with anxiety  - Controlled with Mirtazapine and Risperidone   5. Coronary Artery Disease  - No overt signs or symptoms of decompensation, will continue Nitro prn                                 CARE PLAN: The care plan has been reviewed and discussed with patient and nursing staff. No changes made at this time except those noted above. We will continue to monitor.        Electronically signed by: Tee Nguyen CNP

## 2021-06-16 NOTE — PROGRESS NOTES
[facility]    Facility:   formerly [facility] [[id number]]   Code Status: DNR/DNI      CHIEF COMPLAINT/REASON FOR VISIT:  Chief Complaint   Patient presents with     Problem Visit     agitation       HISTORY:      HPI: Ezekiel is a 89 y.o. male whom I was asked to see specifically about increased agitation and anxiety that occurs in the evening when his wife is not present.  He gets very agitated and screams for help for hours and it is hard to calm him down even after attending to his basic needs.  I talked with both his wife and nursing staff regarding these issues.  He does tend to sleep a lot during the day and his wife would like to be sure that he gets up in the afternoon, but there is also concern about pressure sore of his buttocks area when he is sitting in a wheelchair for long time.  He has always been taking a nap early in the afternoon, and I reassured her that in the early afternoon do not interfere with sleep patterns at night.  She agrees that he would be falling asleep in the wheelchair as well as if he were lying down in bed.     All of review of systems was obtained from nursing staff and wife, because he is sleeping during my exam of him early in the afternoon.    Past Medical History:   Diagnosis Date     Abnormal posture      Adult failure to thrive      Alzheimer Disease Late Onset     Created by Conversion      Anxiety      Arthritis      Cardiac dysrhythmia, unspecified      Coronary artery disease      Coronary Artery Disease     Created by Conversion      Coronary atherosclerosis of unspecified type of vessel, native or graft      Dementia of the Alzheimer's type 7/13/2014     Depression with anxiety 7/29/2014     Difficulty in walking      Encephalopathy acute 7/29/2014     Esophageal reflux     Created by Conversion      Factor V Leiden Mutation     Created by Conversion [facility] Annotation: Oct 28 2008  2:00PM - Corey Moser: heterozygous      Fatigue     Created  by Conversion      Fever 7/28/2014     Hyperlipidemia     Created by Conversion      Major Depression, Single Episode     Created by Conversion      Mood disorder in conditions classified elsewhere 7/29/2014     Muscle weakness (generalized)      Muscle Weakness Generalized     Created by Conversion      Myocardial infarction      Osteoarthritis of knee      Osteoarthritis Of The Knee     Created by Conversion      Other protein-calorie malnutrition      Pressure ulcer, heel      PVD (peripheral vascular disease)      Right Bundle Branch Block With Left Anterior Fascicular Block     Created by Conversion      Sebaceous cyst     Created by Conversion      Sleep Apnea     Created by Conversion      Sleep apnea      Urinary Frequency More Than Twice At Night (Nocturia)     Created by Conversion              Family History   Problem Relation Age of Onset     Cancer Mother      Cancer Sister      Heart disease Sister      Heart disease Brother      Social History     Social History     Marital status:      Spouse name: N/A     Number of children: N/A     Years of education: N/A     Social History Main Topics     Smoking status: Former Smoker     Quit date: 7/28/1960     Smokeless tobacco: Never Used     Alcohol use No     Drug use: No     Sexual activity: No     Other Topics Concern     Not on file     Social History Narrative     No narrative on file         Review of Systems    .  Vitals:    03/05/18 0727   BP: 145/74   Pulse: 86   Resp: 19   Temp: 98.3  F (36.8  C)   SpO2: 95%       Physical Exam   Constitutional: No distress.   Eyes: Right eye exhibits no discharge. Left eye exhibits no discharge.   Cardiovascular: Normal rate and normal heart sounds.    Pulmonary/Chest: Breath sounds normal.   Abdominal: He exhibits no distension. There is no tenderness.   Neurological:   Sleeping   Psychiatric:   Unable to assess due to his neurologic state   Nursing note and vitals reviewed.        LABS:   No new laboratory  testing    ASSESSMENT:      ICD-10-CM    1. Dementia with behavioral disturbance F03.91        PLAN:    I discussed with she and the staff about the increased dose of Risperdal by 0.25 mg in the evening such that he will be taking 0.50 mg in the morning, 0.25 mg at noon and 0.75 mg in the evening total.      Electronically signed by: Francisco Scruggs MD

## 2021-06-16 NOTE — PROGRESS NOTES
[facility]    DATE: 2018    NAME:  Ezekiel Chi             :  1925  MRN: [id number]    CODE STATUS:  DNR  VISIT TYPE: REVIEW OF MULTIPLE CHRONIC MEDICAL CONDITIONS  FACILITY:  [facility] [[id number]]             CHIEF COMPLAIN/REASON FOR VISIT:   Chief Complaint   Patient presents with     Review Of Multiple Medical Conditions     HISTORY OF PRESENT ILLNESS: Ezekiel Chi is a 89 y.o. male is being seen today in the long term care facility for follow up and management of multiple chronic medical conditions.   He has Alzheimer's Dementia with psychosis. He does still have episodes of anxiety related to his Alzheimer's dementia. He's been on Risperdal 0.25 mg TID and staff has requested an increase in his medication secondarily to his episodes of anxiety usually inthe late afternoon and evening.    He does have a history of frequent falls.  Last fall in February 10, 2016.  while trying to self transfer.  Resident was found in a seated position on the floor by nursing staff. Resident had attempted to get out of bed without assistance. He stated that he wanted to see his wife. Resident denied hitting his head during the fall.  Patient does require a mechanical lift for all transfers and one was used to get patient back in bed.    Today, patient was seen in bed.  He has a h/o pressure ulcers on his coccyx that had previously healed. However is was noted that he has a SDTI measuring 3cm x 2.5cm dark purple area noted on right buttock.  Just above this area is an o/a of 0.5cm x 0.5cm. No signs of infection noted at this time. He denies any pain in this area. He now lays down after each meal.    Past Medical History:   Diagnosis Date     Abnormal posture      Adult failure to thrive      Alzheimer Disease Late Onset     Created by Conversion      Anxiety      Arthritis      Cardiac dysrhythmia, unspecified      Coronary artery disease      Coronary Artery Disease      Created by Conversion      Coronary atherosclerosis of unspecified type of vessel, native or graft      Dementia of the Alzheimer's type 7/13/2014     Depression with anxiety 7/29/2014     Difficulty in walking      Encephalopathy acute 7/29/2014     Esophageal reflux     Created by Conversion      Factor V Leiden Mutation     Created by Conversion [facility] Annotation: Oct 28 2008  2:00PM - Corey Moser: heterozygous      Fatigue     Created by Conversion      Fever 7/28/2014     Hyperlipidemia     Created by Conversion      Major Depression, Single Episode     Created by Conversion      Mood disorder in conditions classified elsewhere 7/29/2014     Muscle weakness (generalized)      Muscle Weakness Generalized     Created by Conversion      Myocardial infarction      Osteoarthritis of knee      Osteoarthritis Of The Knee     Created by Conversion      Other protein-calorie malnutrition      Pressure ulcer, heel      PVD (peripheral vascular disease)      Right Bundle Branch Block With Left Anterior Fascicular Block     Created by Conversion      Sebaceous cyst     Created by Conversion      Sleep Apnea     Created by Conversion      Sleep apnea      Urinary Frequency More Than Twice At Night (Nocturia)     Created by Conversion      Past Surgical History:   Procedure Laterality Date     CARDIAC SURGERY       Social History     Social History     Marital status:      Spouse name: N/A     Number of children: N/A     Years of education: N/A     Occupational History     Not on file.     Social History Main Topics     Smoking status: Former Smoker     Quit date: 7/28/1960     Smokeless tobacco: Never Used     Alcohol use No     Drug use: No     Sexual activity: No     Other Topics Concern     Not on file     Social History Narrative     No narrative on file       ALLERGY: No Known Allergies    DIET: Regular    MEDICATIONS:     Current Outpatient Prescriptions   Medication Sig     acetaminophen (TYLENOL) 325  MG tablet Take 650 mg by mouth every 6 (six) hours as needed for pain (Don't exceed 4,000mg acetaminophen in 24 hours).      aspirin 81 MG tablet Take 81 mg by mouth daily. Don't crush or swallow.     bisacodyl (DULCOLAX) 10 mg suppository Insert 10 mg into the rectum every third day as needed.      dextromethorphan-guaiFENesin (ROBITUSSIN-DM)  mg/5 mL liquid Take 10 mL by mouth every 4 (four) hours.     furosemide (LASIX) 20 MG tablet Take 20 mg by mouth daily.      ipratropium-albuterol (DUO-NEB) 0.5-2.5 mg/3 mL nebulizer 3 mL 4 (four) times a day as needed. (starting 2/10)     loperamide (IMODIUM) 2 mg capsule Take 2 mg by mouth 4 (four) times a day as needed for diarrhea. Give 4 mg with first loose stool than 2 mg after each loose stool.  No more than 16 mg daily     menthol-zinc oxide (CALMOSEPTINE) 0.44-20.6 % Oint ointment Apply 1 application topically 3 (three) times a day as needed.     menthol-zinc oxide (CALMOSEPTINE) 0.44-20.6 % Oint ointment Apply 1 application topically 3 (three) times a day.     mirtazapine (REMERON) 15 MG tablet TAKE 1 TABLET BY MOUTH EVERY NIGHT AT BEDTIME     multivitamin (MULTIVITAMIN) per tablet Take 1 tablet by mouth daily.     nitroglycerin (NITROSTAT) 0.4 MG SL tablet Place 0.4 mg under the tongue every 5 (five) minutes as needed for chest pain (Dissolve under tongue every 5 min up to 3 doses for chest pain as needed. Don't chew or swallow.).      risperiDONE (RISPERDAL) 0.25 MG tablet TAKE 1 TABLET BY MOUTH  DAILY AT NOON & 2 TABLETS EVERY MORNING & AT BEDTIME (Patient taking differently: Take 0.25 mg by mouth daily. TAKE 1 TABLET BY MOUTH  DAILY AT NOON & 2 TABLETS EVERY MORNING & AT BEDTIME)     risperiDONE (RISPERDAL) 0.5 MG tablet Take 0.5 mg by mouth 2 (two) times a day.     senna-docusate (SENNOSIDES-DOCUSATE SODIUM) 8.6-50 mg tablet Take 1 tablet by mouth daily.      sodium fluoride (DENTAGEL) 1.1 % Gel dental gel Apply 1 application to teeth bedtime.     wound  dressings (TRIAD WOUND DRESSING) Pste Apply 1 application topically every 8 (eight) hours. Apply to coccyyx     REVIEW OF SYSTEMS    Currently has no fever, chills, or rigors.  He does not have any visual problems but is hard of hearing. He denies any chest pain, headaches, palpitations, lightheadedness, dizziness,  shortness of breath, or cough.  His appetite is good, he denies any GERD symptoms, he denies any difficulty with swallowing, nausea, or vomiting.  He denies any abdominal pain, diarrhea or constipation. He denies any urinary symptoms. No insomnia.  No active bleeding.  No rash.  He does have chronic bilateral lower extremity edema.    PHYSICAL EXAMINATION:    Vitals:    02/23/18 1648   BP: 144/90   Pulse: 88   Resp: 20   Temp: 97.1  F (36.2  C)   SpO2: 90%   Weight: (!) 227 lb 11.2 oz (103.3 kg)     HEENT:Normocephalic/atraumatic Conjunctivae without erythema nares are clear of any drainage.  Neck: No adenopathy JVD thyromegaly  Resp: Clear No rhonchi wheezing rales  Cardio: Regular rate and rhythm 2/6 murmur appreciated  Abdomen: Soft nontender no masses distention bowel sounds are present.  Extremities: 1+ lower extremity edema fair peripheral pulses  Skin: Skin intact No noted skin issues  : N/A  Musculoskeletal: Age-related degenerative joint disease  Psych: Alert oriented to self only    LABS:      Lab Results   Component Value Date    WBC 10.2 03/16/2017    HGB 14.0 03/16/2017    HCT 42.0 03/16/2017    MCV 91 03/16/2017     03/16/2017     Results for orders placed or performed in visit on 11/07/17   Basic Metabolic Panel   Result Value Ref Range    Sodium 138 136 - 145 mmol/L    Potassium 4.4 3.5 - 5.0 mmol/L    Chloride 103 98 - 107 mmol/L    CO2 27 22 - 31 mmol/L    Anion Gap, Calculation 8 5 - 18 mmol/L    Glucose 92 70 - 125 mg/dL    Calcium 8.6 8.5 - 10.5 mg/dL    BUN 13 8 - 28 mg/dL    Creatinine 0.80 0.70 - 1.30 mg/dL    GFR MDRD Af Amer >60 >60 mL/min/1.73m2    GFR MDRD Non Af Amer  >60 >60 mL/min/1.73m2       Lab Results   Component Value Date    [id number] 429 03/16/2017     Vitamin D, Total (25-Hydroxy)   Date Value Ref Range Status   06/12/2017 71.5 30.0 - 80.0 ng/mL Final     Lab Results   Component Value Date    TSH 2.58 03/16/2017     Lab Results   Component Value Date    ALBUMIN 3.0 (L) 01/19/2017       ASSESSMENT/PLAN:    1. Decubitus ulcer of coccyx, unspecified pressure ulcer stage - Will start Arginaid, off load after meals, and Calmoseptin cream.  Dietician following   2. Muscle Weakness Generalized - Progressive   3. Bilateral lower extremity edema - Continue compression wraps   4. Dementia with behavioral disturbance - Neurologically stable on Risperidone and Mirtazapine without any behaviors   5. Essential hypertension with goal blood pressure less than 130/85 - Blood pressures are with target range                                       CARE PLAN: The care plan has been reviewed and discussed with patient and nursing staff. No changes made at this time except those noted above. We will continue to monitor.        Electronically signed by: Tee Nguyen CNP

## 2021-06-17 NOTE — PROGRESS NOTES
[facility]    DATE: 2018    NAME:  Ezekiel Chi             :  1925  MRN: [id number]    CODE STATUS:  DNR  VISIT TYPE: REVIEW OF MULTIPLE CHRONIC MEDICAL CONDITIONS  FACILITY:  [facility] [[id number]]             CHIEF COMPLAIN/REASON FOR VISIT:   Chief Complaint   Patient presents with     Review Of Multiple Medical Conditions     HISTORY OF PRESENT ILLNESS: Ezekiel Chi is a 89 y.o. male is being seen today in the long term care facility for follow up and management of multiple chronic medical conditions.   He has Alzheimer's Dementia with psychosis. He does still have episodes of anxiety related to his Alzheimer's dementia. He's been on Risperdal 0.25 mg TID and staff has requested an increase in his medication secondarily to his episodes of anxiety usually inthe late afternoon and evening.    He does have a history of frequent falls.  Last fall in February 10, 2016.  while trying to self transfer.  Resident was found in a seated position on the floor by nursing staff. Resident had attempted to get out of bed without assistance. He stated that he wanted to see his wife. Resident denied hitting his head during the fall.  Patient does require a mechanical lift for all transfers and one was used to get patient back in bed.    Today, patient was seen in bed.  Patient offers no specific complaints and nursing staff has no concerns at this time.    Past Medical History:   Diagnosis Date     Abnormal posture      Adult failure to thrive      Alzheimer Disease Late Onset     Created by Conversion      Anxiety      Arthritis      Cardiac dysrhythmia, unspecified      Coronary artery disease      Coronary Artery Disease     Created by Conversion      Coronary atherosclerosis of unspecified type of vessel, native or graft      Dementia of the Alzheimer's type 2014     Depression with anxiety 2014     Difficulty in walking      Encephalopathy acute 2014      Esophageal reflux     Created by Conversion      Factor V Leiden Mutation     Created by Conversion [facility] Annotation: Oct 28 2008  2:00PM - Corey Moser: heterozygous      Fatigue     Created by Conversion      Fever 7/28/2014     Hyperlipidemia     Created by Conversion      Major Depression, Single Episode     Created by Conversion      Mood disorder in conditions classified elsewhere 7/29/2014     Muscle weakness (generalized)      Muscle Weakness Generalized     Created by Conversion      Myocardial infarction      Osteoarthritis of knee      Osteoarthritis Of The Knee     Created by Conversion      Other protein-calorie malnutrition      Pressure ulcer, heel      PVD (peripheral vascular disease)      Right Bundle Branch Block With Left Anterior Fascicular Block     Created by Conversion      Sebaceous cyst     Created by Conversion      Sleep Apnea     Created by Conversion      Sleep apnea      Urinary Frequency More Than Twice At Night (Nocturia)     Created by Conversion      Past Surgical History:   Procedure Laterality Date     CARDIAC SURGERY       Social History     Social History     Marital status:      Spouse name: N/A     Number of children: N/A     Years of education: N/A     Occupational History     Not on file.     Social History Main Topics     Smoking status: Former Smoker     Quit date: 7/28/1960     Smokeless tobacco: Never Used     Alcohol use No     Drug use: No     Sexual activity: No     Other Topics Concern     Not on file     Social History Narrative     No narrative on file       ALLERGY: No Known Allergies    DIET: Regular    MEDICATIONS:     Current Outpatient Prescriptions   Medication Sig     acetaminophen (TYLENOL) 325 MG tablet Take 650 mg by mouth every 6 (six) hours as needed for pain (Don't exceed 4,000mg acetaminophen in 24 hours).      aspirin 81 MG tablet Take 81 mg by mouth daily. Don't crush or swallow.     bisacodyl (DULCOLAX) 10 mg suppository Insert  10 mg into the rectum every third day as needed.      dextromethorphan-guaiFENesin (ROBITUSSIN-DM)  mg/5 mL liquid Take 10 mL by mouth every 4 (four) hours.     furosemide (LASIX) 20 MG tablet Take 20 mg by mouth daily.      ipratropium-albuterol (DUO-NEB) 0.5-2.5 mg/3 mL nebulizer 3 mL 4 (four) times a day as needed. (starting 2/10)     loperamide (IMODIUM) 2 mg capsule Take 2 mg by mouth 4 (four) times a day as needed for diarrhea. Give 4 mg with first loose stool than 2 mg after each loose stool.  No more than 16 mg daily     menthol-zinc oxide (CALMOSEPTINE) 0.44-20.6 % Oint ointment Apply 1 application topically 3 (three) times a day as needed.     menthol-zinc oxide (CALMOSEPTINE) 0.44-20.6 % Oint ointment Apply 1 application topically 3 (three) times a day.     mirtazapine (REMERON) 15 MG tablet TAKE 1 TABLET BY MOUTH EVERY NIGHT AT BEDTIME     multivitamin (MULTIVITAMIN) per tablet Take 1 tablet by mouth daily.     nitroglycerin (NITROSTAT) 0.4 MG SL tablet Place 0.4 mg under the tongue every 5 (five) minutes as needed for chest pain (Dissolve under tongue every 5 min up to 3 doses for chest pain as needed. Don't chew or swallow.).      risperiDONE (RISPERDAL) 0.25 MG tablet Take 0.25 mg by mouth 2 (two) times a day.     risperiDONE (RISPERDAL) 0.5 MG tablet Take 0.5 mg by mouth 2 (two) times a day.     senna-docusate (SENNOSIDES-DOCUSATE SODIUM) 8.6-50 mg tablet Take 1 tablet by mouth daily.      sodium fluoride (DENTAGEL) 1.1 % Gel dental gel Apply 1 application to teeth bedtime.     wound dressings (TRIAD WOUND DRESSING) Pste Apply 1 application topically every 8 (eight) hours. Apply to coccyyx     REVIEW OF SYSTEMS    Currently has no fever, chills, or rigors.  He does not have any visual problems but is hard of hearing. He denies any chest pain, headaches, palpitations, lightheadedness, dizziness,  shortness of breath, or cough.  His appetite is good, he denies any GERD symptoms, he denies any  difficulty with swallowing, nausea, or vomiting.  He denies any abdominal pain, diarrhea or constipation. He denies any urinary symptoms. No insomnia.  No active bleeding.  No rash.  He does have chronic bilateral lower extremity edema.    PHYSICAL EXAMINATION:    Vitals:    04/24/18 1746   BP: 129/74   Pulse: 68   Resp: 18   Temp: 97.7  F (36.5  C)   SpO2: 96%   Weight: (!) 228 lb 6.4 oz (103.6 kg)     HEENT:Normocephalic/atraumatic Conjunctivae without erythema nares are clear of any drainage.  Neck: No adenopathy JVD thyromegaly  Resp: Clear No rhonchi wheezing rales  Cardio: Regular rate and rhythm 2/6 murmur appreciated  Abdomen: Soft nontender no masses distention bowel sounds are present.  Extremities: 1+ lower extremity edema fair peripheral pulses  Skin: Skin intact No noted skin issues  : N/A  Musculoskeletal: Age-related degenerative joint disease  Psych: Alert oriented to self only    LABS:      Lab Results   Component Value Date    WBC 10.2 03/16/2017    HGB 14.0 03/16/2017    HCT 42.0 03/16/2017    MCV 91 03/16/2017     03/16/2017     Results for orders placed or performed in visit on 11/07/17   Basic Metabolic Panel   Result Value Ref Range    Sodium 138 136 - 145 mmol/L    Potassium 4.4 3.5 - 5.0 mmol/L    Chloride 103 98 - 107 mmol/L    CO2 27 22 - 31 mmol/L    Anion Gap, Calculation 8 5 - 18 mmol/L    Glucose 92 70 - 125 mg/dL    Calcium 8.6 8.5 - 10.5 mg/dL    BUN 13 8 - 28 mg/dL    Creatinine 0.80 0.70 - 1.30 mg/dL    GFR MDRD Af Amer >60 >60 mL/min/1.73m2    GFR MDRD Non Af Amer >60 >60 mL/min/1.73m2       Lab Results   Component Value Date    [id number] 429 03/16/2017     Vitamin D, Total (25-Hydroxy)   Date Value Ref Range Status   06/12/2017 71.5 30.0 - 80.0 ng/mL Final     Lab Results   Component Value Date    TSH 2.58 03/16/2017     Lab Results   Component Value Date    ALBUMIN 3.0 (L) 01/19/2017       ASSESSMENT/PLAN:    1. Dementia with behavioral disturbance - Neurologically  stable on Risperidone and Mirtazapine without any behaviors   2. Muscle Weakness Generalized - Progressive   3. Depression with anxiety - Mood is stable on Mirtazapine and Risperdal   4. Osteoarthritis Of The Knee - Continue on Tylenol   5. Essential hypertension with goal blood pressure less than 140/90 - Blood pressures are within target range, will continue Lasix   6. Coronary Artery Disease - No overt signs or symptoms of decompensation, will continue Nitroglycerin and ASA.                    CARE PLAN: The care plan has been reviewed and discussed with patient and nursing staff. No changes made at this time. We will continue to monitor.        Electronically signed by: Tee Nguyen CNP

## 2021-06-17 NOTE — PROGRESS NOTES
[facility]    Facility:   [facility] [[id number]]   Code Status: DNR/DNI      CHIEF COMPLAINT/REASON FOR VISIT:  Chief Complaint   Patient presents with     Problem Visit     heel sore       HISTORY:      HPI: Ezekiel is a 89 y.o. male whom I was asked to see regarding the appearance of his right heel where there is a change of the skin.  Also of concern is his ongoing behavior in relation to his Alzheimer's dementia such that as soon as his wife believes in the evening after supper he is quite agitated.    Upon review of systems with nursing staff since he is not able to respond with his dementia, there is no problem with fevers or chills nor cough or shortness of breath or other signs of distress such as abdominal pain or nausea or emesis.    Past Medical History:   Diagnosis Date     Abnormal posture      Adult failure to thrive      Alzheimer Disease Late Onset     Created by Conversion      Anxiety      Arthritis      Cardiac dysrhythmia, unspecified      Coronary artery disease      Coronary Artery Disease     Created by Conversion      Coronary atherosclerosis of unspecified type of vessel, native or graft      Dementia of the Alzheimer's type 7/13/2014     Depression with anxiety 7/29/2014     Difficulty in walking      Encephalopathy acute 7/29/2014     Esophageal reflux     Created by Conversion      Factor V Leiden Mutation     Created by Conversion [facility] Annotation: Oct 28 2008  2:00PM - Corey Moser: heterozygous      Fatigue     Created by Conversion      Fever 7/28/2014     Hyperlipidemia     Created by Conversion      Major Depression, Single Episode     Created by Conversion      Mood disorder in conditions classified elsewhere 7/29/2014     Muscle weakness (generalized)      Muscle Weakness Generalized     Created by Conversion      Myocardial infarction      Osteoarthritis of knee      Osteoarthritis Of The Knee     Created by Conversion      Other  protein-calorie malnutrition      Pressure ulcer, heel      PVD (peripheral vascular disease)      Right Bundle Branch Block With Left Anterior Fascicular Block     Created by Conversion      Sebaceous cyst     Created by Conversion      Sleep Apnea     Created by Conversion      Sleep apnea      Urinary Frequency More Than Twice At Night (Nocturia)     Created by Conversion              Family History   Problem Relation Age of Onset     Cancer Mother      Cancer Sister      Heart disease Sister      Heart disease Brother      Social History     Social History     Marital status:      Spouse name: N/A     Number of children: N/A     Years of education: N/A     Social History Main Topics     Smoking status: Former Smoker     Quit date: 7/28/1960     Smokeless tobacco: Never Used     Alcohol use No     Drug use: No     Sexual activity: No     Other Topics Concern     Not on file     Social History Narrative     No narrative on file         Review of Systems   Unable to perform ROS: Dementia       .There were no vitals filed for this visit.    Physical Exam   Constitutional: No distress.   Neurological:   Sleepy but awakens easily   Skin:   The right heel has a blister that appears to have popped, and there is surrounding redness of the skin   Psychiatric: He has a normal mood and affect.   Nursing note and vitals reviewed.        LABS:   No new laboratory testing    ASSESSMENT:      ICD-10-CM    1. Cellulitis L03.90    2. Late onset Alzheimer's disease with behavioral disturbance G30.1     F02.81        PLAN:    A pillow will be used in place of the current protective heel device which appears to be pressing in the area where this blister has occurred.  Antibiotic was started for suspected cellulitis.  The total dose of his Risperdal will be the same per day but it will be 0.5 mg morning and bedtime and 0.25 mg at noon and supper with hopes that the dosing at supper will carry through with me evening hours  better than what has been happening.  I did attempt calling his wife and left a message.      Electronically signed by: Francisco Scruggs MD

## 2021-06-18 NOTE — PROGRESS NOTES
[facility]    DATE: 2018    NAME:  Ezekiel Chi             :  1925  MRN: [id number]    CODE STATUS:  DNR  VISIT TYPE: REVIEW OF MULTIPLE CHRONIC MEDICAL CONDITIONS  FACILITY:  [facility] [[id number]]             CHIEF COMPLAIN/REASON FOR VISIT:   Chief Complaint   Patient presents with     Review Of Multiple Medical Conditions     HISTORY OF PRESENT ILLNESS: Ezekiel Chi is a 89 y.o. male is being seen today in the long term care facility for follow up and management of multiple chronic medical conditions.   He has Alzheimer's Dementia with psychosis. He does still have episodes of anxiety related to his Alzheimer's dementia. He's been on Risperdal 0.25 mg TID and staff has requested an increase in his medication secondarily to his episodes of anxiety usually inthe late afternoon and evening.    He does have a history of frequent falls.  Last fall in February 10, 2016.  while trying to self transfer.  Resident was found in a seated position on the floor by nursing staff. Resident had attempted to get out of bed without assistance. He stated that he wanted to see his wife. Resident denied hitting his head during the fall.  Patient does require a mechanical lift for all transfers and one was used to get patient back in bed.    Today, patient was seen in bed. Resident s cognition and mood remain stable. Per staff assessment resident is mildly Depressed due to lack of energy and interest in activities. Resident enjoys 1:1 time with staff and family, listening to music, and sitting in his doorway watching people pass by. Resident continues to have visits with the . Resident is accepting of NDD2 die with no change in weight.  Last documented weight is 231.7 lbs.    Past Medical History:   Diagnosis Date     Abnormal posture      Adult failure to thrive      Alzheimer Disease Late Onset     Created by Conversion      Anxiety      Arthritis      Cardiac  dysrhythmia, unspecified      Coronary artery disease      Coronary Artery Disease     Created by Conversion      Coronary atherosclerosis of unspecified type of vessel, native or graft      Dementia of the Alzheimer's type 7/13/2014     Depression with anxiety 7/29/2014     Difficulty in walking      Encephalopathy acute 7/29/2014     Esophageal reflux     Created by Conversion      Factor V Leiden Mutation     Created by Conversion [facility] Annotation: Oct 28 2008  2:00PM - Corey Moser: heterozygous      Fatigue     Created by Conversion      Fever 7/28/2014     Hyperlipidemia     Created by Conversion      Major Depression, Single Episode     Created by Conversion      Mood disorder in conditions classified elsewhere 7/29/2014     Muscle weakness (generalized)      Muscle Weakness Generalized     Created by Conversion      Myocardial infarction      Osteoarthritis of knee      Osteoarthritis Of The Knee     Created by Conversion      Other protein-calorie malnutrition      Pressure ulcer, heel      PVD (peripheral vascular disease)      Right Bundle Branch Block With Left Anterior Fascicular Block     Created by Conversion      Sebaceous cyst     Created by Conversion      Sleep Apnea     Created by Conversion      Sleep apnea      Urinary Frequency More Than Twice At Night (Nocturia)     Created by Conversion      Past Surgical History:   Procedure Laterality Date     CARDIAC SURGERY       Social History     Social History     Marital status:      Spouse name: N/A     Number of children: N/A     Years of education: N/A     Occupational History     Not on file.     Social History Main Topics     Smoking status: Former Smoker     Quit date: 7/28/1960     Smokeless tobacco: Never Used     Alcohol use No     Drug use: No     Sexual activity: No     Other Topics Concern     Not on file     Social History Narrative     No narrative on file       ALLERGY: No Known Allergies    DIET: Regular    MEDICATIONS:      Current Outpatient Prescriptions   Medication Sig     acetaminophen (TYLENOL) 325 MG tablet Take 650 mg by mouth every 6 (six) hours as needed for pain (Don't exceed 4,000mg acetaminophen in 24 hours).      aspirin 81 MG tablet Take 81 mg by mouth daily. Don't crush or swallow.     bisacodyl (DULCOLAX) 10 mg suppository Insert 10 mg into the rectum every third day as needed.      dextromethorphan-guaiFENesin (ROBITUSSIN-DM)  mg/5 mL liquid Take 10 mL by mouth every 4 (four) hours.     furosemide (LASIX) 20 MG tablet Take 20 mg by mouth daily.      ipratropium-albuterol (DUO-NEB) 0.5-2.5 mg/3 mL nebulizer 3 mL 4 (four) times a day as needed. (starting 2/10)     loperamide (IMODIUM) 2 mg capsule Take 2 mg by mouth 4 (four) times a day as needed for diarrhea. Give 4 mg with first loose stool than 2 mg after each loose stool.  No more than 16 mg daily     menthol-zinc oxide (CALMOSEPTINE) 0.44-20.6 % Oint ointment Apply 1 application topically 3 (three) times a day as needed.     menthol-zinc oxide (CALMOSEPTINE) 0.44-20.6 % Oint ointment Apply 1 application topically 3 (three) times a day.     mirtazapine (REMERON) 15 MG tablet Take 1 tablet (15 mg total) by mouth at bedtime.     multivitamin (MULTIVITAMIN) per tablet Take 1 tablet by mouth daily.     nitroglycerin (NITROSTAT) 0.4 MG SL tablet Place 0.4 mg under the tongue every 5 (five) minutes as needed for chest pain (Dissolve under tongue every 5 min up to 3 doses for chest pain as needed. Don't chew or swallow.).      risperiDONE (RISPERDAL) 0.25 MG tablet Take 0.25 mg by mouth 2 (two) times a day. (noon and dinner)     risperiDONE (RISPERDAL) 0.5 MG tablet Take 0.5 mg by mouth 2 (two) times a day. (AM and HS)     senna-docusate (SENNOSIDES-DOCUSATE SODIUM) 8.6-50 mg tablet Take 1 tablet by mouth daily.      sodium fluoride (DENTAGEL) 1.1 % Gel dental gel Apply 1 application to teeth bedtime.     wound dressings (TRIAD WOUND DRESSING) Pste Apply 1  application topically every 8 (eight) hours. Apply to coccyyx     REVIEW OF SYSTEMS    Currently has no fever, chills, or rigors.  He does not have any visual problems but is hard of hearing. He denies any chest pain, headaches, palpitations, lightheadedness, dizziness,  shortness of breath, or cough.  His appetite is good, he denies any GERD symptoms, he denies any difficulty with swallowing, nausea, or vomiting.  He denies any abdominal pain, diarrhea or constipation. He denies any urinary symptoms. No insomnia.  No active bleeding.  No rash.  He does have chronic bilateral lower extremity edema.    PHYSICAL EXAMINATION:    Vitals:    05/29/18 2348   BP: 132/70   Pulse: 64   Resp: 17   Temp: 96.9  F (36.1  C)   SpO2: 97%   Weight: (!) 231 lb 11.2 oz (105.1 kg)     HEENT:Normocephalic/atraumatic Conjunctivae without erythema nares are clear of any drainage.  Neck: No adenopathy JVD thyromegaly  Resp: Clear No rhonchi wheezing rales  Cardio: Regular rate and rhythm 2/6 murmur appreciated  Abdomen: Soft nontender no masses distention bowel sounds are present.  Extremities: 1+ lower extremity edema fair peripheral pulses  Skin: Skin intact No noted skin issues  : N/A  Musculoskeletal: Age-related degenerative joint disease  Psych: Alert oriented to self only    LABS:      Lab Results   Component Value Date    WBC 10.2 03/16/2017    HGB 14.0 03/16/2017    HCT 42.0 03/16/2017    MCV 91 03/16/2017     03/16/2017     Results for orders placed or performed in visit on 11/07/17   Basic Metabolic Panel   Result Value Ref Range    Sodium 138 136 - 145 mmol/L    Potassium 4.4 3.5 - 5.0 mmol/L    Chloride 103 98 - 107 mmol/L    CO2 27 22 - 31 mmol/L    Anion Gap, Calculation 8 5 - 18 mmol/L    Glucose 92 70 - 125 mg/dL    Calcium 8.6 8.5 - 10.5 mg/dL    BUN 13 8 - 28 mg/dL    Creatinine 0.80 0.70 - 1.30 mg/dL    GFR MDRD Af Amer >60 >60 mL/min/1.73m2    GFR MDRD Non Af Amer >60 >60 mL/min/1.73m2       Lab Results    Component Value Date    YLQFIVYY20 429 03/16/2017     Vitamin D, Total (25-Hydroxy)   Date Value Ref Range Status   06/12/2017 71.5 30.0 - 80.0 ng/mL Final     Lab Results   Component Value Date    TSH 2.58 03/16/2017     Lab Results   Component Value Date    ALBUMIN 3.0 (L) 01/19/2017       ASSESSMENT/PLAN:    1. Depression with anxiety  - Mood is stable on Mirtazapine and Risperdal   2. Muscle Weakness Generalized - Progressive.  Patient sits in a Broda chair all day.   3. Dementia with behavioral disturbance  - Neurologically stable on Risperidone and Mirtazapine without any behaviors   4. Coronary Artery Disease - No overt signs or symptoms of decompensation, will continue Nitroglycerin and ASA.    5. Fatigue - Will check CBC, TSH, and Albumin                   CARE PLAN: The care plan has been reviewed and discussed with patient and nursing staff. No changes made at this time except those noted above. We will continue to monitor.        Electronically signed by: Tee Nguyen CNP

## 2021-06-18 NOTE — PROGRESS NOTES
[facility]    DATE: 2018    NAME:  Ezekiel Chi             :  1925  MRN: [id number]    CODE STATUS:  DNR  VISIT TYPE: REVIEW OF MULTIPLE CHRONIC MEDICAL CONDITIONS  FACILITY:  [facility] [[id number]]             CHIEF COMPLAIN/REASON FOR VISIT:   Chief Complaint   Patient presents with     Review Of Multiple Medical Conditions     HISTORY OF PRESENT ILLNESS: Ezekiel Chi is a 89 y.o. male is being seen today in the long term care facility for follow up and management of multiple chronic medical conditions.   He has Alzheimer's Dementia with psychosis.  He is on Risperdal 0.25 mg two times a day (5 am and 5 pm) and 0.5 mg two times a day (every morning and every evening).     Today, patient was seen in his room.  He is on Lasix for bilateral lower extremity edema.   Appetite is good.  Patient does require a mechanical lift for all transfers.  Normotensive.  He is on Arginaid a stage 2 pressure injury with partial-thickness skin loss with exposed dermis.  This continues to improve.  Area is dry and scabbed over without any secondary signs or symptoms of infection.    Past Medical History:   Diagnosis Date     Abnormal posture      Adult failure to thrive      Alzheimer Disease Late Onset     Created by Conversion      Anxiety      Arthritis      Cardiac dysrhythmia, unspecified      Coronary artery disease      Coronary Artery Disease     Created by Conversion      Coronary atherosclerosis of unspecified type of vessel, native or graft      Dementia of the Alzheimer's type 2014     Depression with anxiety 2014     Difficulty in walking      Encephalopathy acute 2014     Esophageal reflux     Created by Conversion      Factor V Leiden Mutation     Created by Conversion [facility] Annotation: Oct 28 2008  2:00PM - Corey Moser: heterozygous      Fatigue     Created by Conversion      Fever 2014     Hyperlipidemia     Created by Conversion       Major Depression, Single Episode     Created by Conversion      Mood disorder in conditions classified elsewhere 7/29/2014     Muscle weakness (generalized)      Muscle Weakness Generalized     Created by Conversion      Myocardial infarction      Osteoarthritis of knee      Osteoarthritis Of The Knee     Created by Conversion      Other protein-calorie malnutrition      Pressure ulcer, heel      PVD (peripheral vascular disease) (H)      Right Bundle Branch Block With Left Anterior Fascicular Block     Created by Conversion      Sebaceous cyst     Created by Conversion      Sleep Apnea     Created by Conversion      Sleep apnea      Urinary Frequency More Than Twice At Night (Nocturia)     Created by Conversion      Past Surgical History:   Procedure Laterality Date     CARDIAC SURGERY       Social History     Social History     Marital status:      Spouse name: N/A     Number of children: N/A     Years of education: N/A     Occupational History     Not on file.     Social History Main Topics     Smoking status: Former Smoker     Quit date: 7/28/1960     Smokeless tobacco: Never Used     Alcohol use No     Drug use: No     Sexual activity: No     Other Topics Concern     Not on file     Social History Narrative     No narrative on file       ALLERGY: No Known Allergies    DIET: Regular    MEDICATIONS:     Current Outpatient Prescriptions   Medication Sig     acetaminophen (TYLENOL) 325 MG tablet Take 650 mg by mouth every 6 (six) hours as needed for pain (Don't exceed 4,000mg acetaminophen in 24 hours).      aspirin 81 MG tablet Take 81 mg by mouth daily. Don't crush or swallow.     bisacodyl (DULCOLAX) 10 mg suppository Insert 10 mg into the rectum every third day as needed.      dextromethorphan-guaiFENesin (ROBITUSSIN-DM)  mg/5 mL liquid Take 10 mL by mouth every 4 (four) hours.     furosemide (LASIX) 20 MG tablet Take 20 mg by mouth daily.      ipratropium-albuterol (DUO-NEB) 0.5-2.5 mg/3 mL  nebulizer 3 mL 4 (four) times a day as needed. (starting 2/10)     loperamide (IMODIUM) 2 mg capsule Take 2 mg by mouth 4 (four) times a day as needed for diarrhea. Give 4 mg with first loose stool than 2 mg after each loose stool.  No more than 16 mg daily     menthol-zinc oxide (CALMOSEPTINE) 0.44-20.6 % Oint ointment Apply 1 application topically 3 (three) times a day as needed.     menthol-zinc oxide (CALMOSEPTINE) 0.44-20.6 % Oint ointment Apply 1 application topically 3 (three) times a day.     mirtazapine (REMERON) 15 MG tablet Take 1 tablet (15 mg total) by mouth at bedtime.     multivitamin (MULTIVITAMIN) per tablet Take 1 tablet by mouth daily.     nitroglycerin (NITROSTAT) 0.4 MG SL tablet Place 0.4 mg under the tongue every 5 (five) minutes as needed for chest pain (Dissolve under tongue every 5 min up to 3 doses for chest pain as needed. Don't chew or swallow.).      nutritional supplements Pack Take 1 packet by mouth 2 (two) times a day.     risperiDONE (RISPERDAL) 0.25 MG tablet Take 0.25 mg by mouth 2 (two) times a day. (noon and dinner)     risperiDONE (RISPERDAL) 0.5 MG tablet Take 0.5 mg by mouth 2 (two) times a day. (AM and HS)     senna-docusate (SENNOSIDES-DOCUSATE SODIUM) 8.6-50 mg tablet Take 1 tablet by mouth daily.      sodium fluoride (DENTAGEL) 1.1 % Gel dental gel Apply 1 application to teeth bedtime.     REVIEW OF SYSTEMS    Currently has no fever, chills, or rigors.  He does not have any visual problems but is hard of hearing. He denies any chest pain, headaches, palpitations, lightheadedness, dizziness,  shortness of breath, or cough.  His appetite is good, he denies any GERD symptoms, he denies any difficulty with swallowing, nausea, or vomiting.  He denies any abdominal pain, diarrhea or constipation. He denies any urinary symptoms. No insomnia.  No active bleeding.  No rash.  He does have chronic bilateral lower extremity edema.    PHYSICAL EXAMINATION:    Vitals:    06/28/18 1126    BP: 122/66   Pulse: 72   Resp: 18   Temp: 97.8  F (36.6  C)   SpO2: 96%   Weight: (!) 222 lb 3.2 oz (100.8 kg)     HEENT:Normocephalic/atraumatic Conjunctivae without erythema nares are clear of any drainage.  Neck: No adenopathy JVD thyromegaly  Resp: Clear No rhonchi wheezing rales  Cardio: Regular rate and rhythm 2/6 murmur appreciated  Abdomen: Soft nontender no masses distention bowel sounds are present.  Extremities: 1+ lower extremity edema fair peripheral pulses  Skin: Skin intact No noted skin issues  : N/A  Musculoskeletal: Age-related degenerative joint disease  Psych: Alert oriented to self only    LABS:      Lab Results   Component Value Date    WBC 10.2 03/16/2017    HGB 14.0 03/16/2017    HCT 42.0 03/16/2017    MCV 91 03/16/2017     03/16/2017     Results for orders placed or performed in visit on 11/07/17   Basic Metabolic Panel   Result Value Ref Range    Sodium 138 136 - 145 mmol/L    Potassium 4.4 3.5 - 5.0 mmol/L    Chloride 103 98 - 107 mmol/L    CO2 27 22 - 31 mmol/L    Anion Gap, Calculation 8 5 - 18 mmol/L    Glucose 92 70 - 125 mg/dL    Calcium 8.6 8.5 - 10.5 mg/dL    BUN 13 8 - 28 mg/dL    Creatinine 0.80 0.70 - 1.30 mg/dL    GFR MDRD Af Amer >60 >60 mL/min/1.73m2    GFR MDRD Non Af Amer >60 >60 mL/min/1.73m2       Lab Results   Component Value Date    [id number] 429 03/16/2017     Vitamin D, Total (25-Hydroxy)   Date Value Ref Range Status   06/12/2017 71.5 30.0 - 80.0 ng/mL Final     Lab Results   Component Value Date    TSH 2.58 03/16/2017     Lab Results   Component Value Date    ALBUMIN 3.0 (L) 01/19/2017       ASSESSMENT/PLAN:    1. Muscle Weakness Generalized - Stable in Broda chair   2. Decubitus ulcer of right heel, stage 2 - Healing, continue Nutritional supplement.  Check Albumin    3. Alzheimer's dementia without behavioral disturbance, unspecified timing of dementia onset - Check TSH and Vitamin B12.  Neurologically stable, will continue Mirtazapine and Risperidone    4. Depression with anxiety - See above   5. Essential hypertension with goal blood pressure less than 140/90 - Blood pressures are within target range, will continue Lasix.  Check BMP                 CARE PLAN: The care plan has been reviewed and discussed with patient and nursing staff. Check CBC.. We will continue to monitor.        Electronically signed by: Tee Nguyen CNP

## 2021-06-18 NOTE — PROGRESS NOTES
[facility]    Facility:   ANSWER ROOMING ACTIVITY QUESTION   Code Status: DNR/DNI      CHIEF COMPLAINT/REASON FOR VISIT:  Chief Complaint   Patient presents with     Problem Visit     foot ulcer, cellulitis       HISTORY:      HPI: Ezekiel is a 89 y.o. male whom I was asked to see by nursing for follow-up of cellulitis from last week.  There is a new cushion boot available to that nursing wanted me to take a look at.  There has been improvement of his rash and the blister that was present per nursing.  Also has had no fevers or chills.  Of note is entered in the intestinal point for his medication dosing change for Risperdal last week such that he got 5 mg the first morning.  When this was called to nurse triage medication was held for the rest of the day.  Nursing states that he had an uneventful day and was not overly groggy and there were no behavioral problems that developed.  He is now on the new schedule of 0.5 mg morning and night and 0.25 mg at noon and supper.  He has been doing all of the normal activities for him during the day and not having problems with sleep at night according to nursing.    Past Medical History:   Diagnosis Date     Abnormal posture      Adult failure to thrive      Alzheimer Disease Late Onset     Created by Conversion      Anxiety      Arthritis      Cardiac dysrhythmia, unspecified      Coronary artery disease      Coronary Artery Disease     Created by Conversion      Coronary atherosclerosis of unspecified type of vessel, native or graft      Dementia of the Alzheimer's type 7/13/2014     Depression with anxiety 7/29/2014     Difficulty in walking      Encephalopathy acute 7/29/2014     Esophageal reflux     Created by Conversion      Factor V Leiden Mutation     Created by Conversion [facility] Annotation: Oct 28 2008  2:00PM - Corey Moser: heterozygous      Fatigue     Created by Conversion      Fever 7/28/2014     Hyperlipidemia     Created by  Conversion      Major Depression, Single Episode     Created by Conversion      Mood disorder in conditions classified elsewhere 7/29/2014     Muscle weakness (generalized)      Muscle Weakness Generalized     Created by Conversion      Myocardial infarction      Osteoarthritis of knee      Osteoarthritis Of The Knee     Created by Conversion      Other protein-calorie malnutrition      Pressure ulcer, heel      PVD (peripheral vascular disease)      Right Bundle Branch Block With Left Anterior Fascicular Block     Created by Conversion      Sebaceous cyst     Created by Conversion      Sleep Apnea     Created by Conversion      Sleep apnea      Urinary Frequency More Than Twice At Night (Nocturia)     Created by Conversion              Family History   Problem Relation Age of Onset     Cancer Mother      Cancer Sister      Heart disease Sister      Heart disease Brother      Social History     Social History     Marital status:      Spouse name: N/A     Number of children: N/A     Years of education: N/A     Social History Main Topics     Smoking status: Former Smoker     Quit date: 7/28/1960     Smokeless tobacco: Never Used     Alcohol use No     Drug use: No     Sexual activity: No     Other Topics Concern     Not on file     Social History Narrative     No narrative on file         Review of Systems   Unable to perform ROS: Dementia       .  Vitals:    05/07/18 1558   BP: 122/81   Pulse: 89   Resp: 18   Temp: 97  F (36.1  C)   SpO2: 99%       Physical Exam   Constitutional: No distress.   Eyes: Right eye exhibits no discharge. Left eye exhibits no discharge.   Neurological:   I am seeing him in the mid to later afternoon and he is the most alert and pleasant but I have ever experienced in my encounters with him.  He attempts to interact verbally but no meaningful speech is able to be obtained.   Skin:   The area of blister has dried up and there is not warmth redness or tenderness around this area for  the right heel.   Psychiatric: He has a normal mood and affect.   Nursing note and vitals reviewed.        LABS:   No new laboratory testing    ASSESSMENT:      ICD-10-CM    1. Cellulitis of right lower extremity L03.115    2. Late onset Alzheimer's disease with behavioral disturbance G30.1     F02.81        PLAN:    Start the new boot which has excellent padding and no firm spot along the back or bottom of the boot and in fact there is a cutout hole for the heel.  Finish the antibiotic.  Continue the current dosing of Risperdal.  It appears like there is healing of the cellulitis and that he suffered no adverse side effects from the inadvertent large dose of Risperdal and in fact this new dosing schedule appears to be a good one for him.      Electronically signed by: Francisco Scruggs MD

## 2021-06-19 NOTE — PROGRESS NOTES
[facility]    Facility:   [facility] [[id number]]   Code Status: DNR      CHIEF COMPLAINT/REASON FOR VISIT:  Chief Complaint   Patient presents with     Problem Visit     agitation       HISTORY:      HPI: Ezekiel is a 89 y.o. male whom I was asked to see today due to increasing outbursts of yelling and more aggressive behaviors in the last few days.  In review of systems with the nursing staff the wound of his heel has been steadily improving and he has not shown signs of infection such as fever nor cough or shortness of breath nor new urinary symptoms.    Past Medical History:   Diagnosis Date     Abnormal posture      Adult failure to thrive      Alzheimer Disease Late Onset     Created by Conversion      Anxiety      Arthritis      Cardiac dysrhythmia, unspecified      Coronary artery disease      Coronary Artery Disease     Created by Conversion      Coronary atherosclerosis of unspecified type of vessel, native or graft      Dementia of the Alzheimer's type 7/13/2014     Depression with anxiety 7/29/2014     Difficulty in walking      Encephalopathy acute 7/29/2014     Esophageal reflux     Created by Conversion      Factor V Leiden Mutation     Created by Conversion [facility] Annotation: Oct 28 2008  2:00PM - Corey Moser: heterozygous      Fatigue     Created by Conversion      Fever 7/28/2014     Hyperlipidemia     Created by Conversion      Major Depression, Single Episode     Created by Conversion      Mood disorder in conditions classified elsewhere 7/29/2014     Muscle weakness (generalized)      Muscle Weakness Generalized     Created by Conversion      Myocardial infarction      Osteoarthritis of knee      Osteoarthritis Of The Knee     Created by Conversion      Other protein-calorie malnutrition      Pressure ulcer, heel      PVD (peripheral vascular disease) (H)      Right Bundle Branch Block With Left Anterior Fascicular Block     Created by Conversion       Sebaceous cyst     Created by Conversion      Sleep Apnea     Created by Conversion      Sleep apnea      Urinary Frequency More Than Twice At Night (Nocturia)     Created by Conversion              Family History   Problem Relation Age of Onset     Cancer Mother      Cancer Sister      Heart disease Sister      Heart disease Brother      Social History     Social History     Marital status:      Spouse name: N/A     Number of children: N/A     Years of education: N/A     Social History Main Topics     Smoking status: Former Smoker     Quit date: 7/28/1960     Smokeless tobacco: Never Used     Alcohol use No     Drug use: No     Sexual activity: No     Other Topics Concern     Not on file     Social History Narrative     No narrative on file         Review of Systems    .There were no vitals filed for this visit.    Physical Exam   Constitutional: No distress.   Eyes: Right eye exhibits no discharge. Left eye exhibits no discharge.   Neurological:   He wakens from sleep easily and is not agitated   Psychiatric: He has a normal mood and affect.   Nursing note and vitals reviewed.        LABS:   No new laboratory testing    ASSESSMENT:      ICD-10-CM    1. Dementia with behavioral disturbance, unspecified dementia type F03.91        PLAN:    I telephoned his wife and explained the situation.  I also apologized to her for my error in writing the change of dosing for Risperdal about 1 month ago which resulted in a single overdose of the medication.  She accepted my apology and states that she was informed of the error at the time.  She thinks me for my efforts in his complex medical care.  I mentioned that the good news about this is that it allows us to know that he can tolerate a much higher dose without any significant side effect and for this reason I propose having an additional as needed dose of Risperdal 0.25 mg once daily and continue with his current scheduled dosing.  She is in agreement with  this.      Electronically signed by: Francisco Scruggs MD

## 2021-06-19 NOTE — PROGRESS NOTES
[facility]    Facility:   [facility] [[id number]]   Code Status: DNR/DNI      CHIEF COMPLAINT/REASON FOR VISIT:  Chief Complaint   Patient presents with     Problem Visit     hypoxia       HISTORY:      HPI: Ezekiel is a 89 y.o. male whom I was asked to see by nursing staff regarding oxygen saturations in the 80s.  It took 3 L of oxygen by nasal cannula to achieve O2 saturation of 92%.  He has not been feeling ill in other ways in terms of fever or chills.  He chronically has some cough first thing in the morning but it has not worsened.  He is not complaining of chest pain.  The nurses have noticed no other change other than the oxygen saturation tends to be worse when he is lying down than when he is sitting up.  His dementia with behavior disturbance has been better in recent days.  Upon review of his records, he does have factor V Leiden heterozygous state.  He does have history of coronary artery disease.    Upon current review of systems, he is awake more than I have ever seen him before and conversant with me and smiles.  He denies sore throat or nasal congestion or cough or shortness of breath or chest pain or abdominal pain or nausea.    Past Medical History:   Diagnosis Date     Abnormal posture      Adult failure to thrive      Alzheimer Disease Late Onset     Created by Conversion      Anxiety      Arthritis      Cardiac dysrhythmia, unspecified      Coronary artery disease      Coronary Artery Disease     Created by Conversion      Coronary atherosclerosis of unspecified type of vessel, native or graft      Dementia of the Alzheimer's type 7/13/2014     Depression with anxiety 7/29/2014     Difficulty in walking      Encephalopathy acute 7/29/2014     Esophageal reflux     Created by Conversion      Factor V Leiden Mutation     Created by Conversion [facility] Annotation: Oct 28 2008  2:00PM - Corey Moser: heterozygous      Fatigue     Created by Conversion      Fever  7/28/2014     Hyperlipidemia     Created by Conversion      Major Depression, Single Episode     Created by Conversion      Mood disorder in conditions classified elsewhere 7/29/2014     Muscle weakness (generalized)      Muscle Weakness Generalized     Created by Conversion      Myocardial infarction      Osteoarthritis of knee      Osteoarthritis Of The Knee     Created by Conversion      Other protein-calorie malnutrition      Pressure ulcer, heel      PVD (peripheral vascular disease) (H)      Right Bundle Branch Block With Left Anterior Fascicular Block     Created by Conversion      Sebaceous cyst     Created by Conversion      Sleep Apnea     Created by Conversion      Sleep apnea      Urinary Frequency More Than Twice At Night (Nocturia)     Created by Conversion              Family History   Problem Relation Age of Onset     Cancer Mother      Cancer Sister      Heart disease Sister      Heart disease Brother      Social History     Social History     Marital status:      Spouse name: N/A     Number of children: N/A     Years of education: N/A     Social History Main Topics     Smoking status: Former Smoker     Quit date: 7/28/1960     Smokeless tobacco: Never Used     Alcohol use No     Drug use: No     Sexual activity: No     Other Topics Concern     Not on file     Social History Narrative     No narrative on file         Review of Systems    .There were no vitals filed for this visit.    Physical Exam   Constitutional: No distress.   HENT:   Mouth/Throat: Oropharynx is clear and moist.   Eyes: Right eye exhibits no discharge. Left eye exhibits no discharge.   Cardiovascular: Normal heart sounds.    Pulmonary/Chest: Breath sounds normal. No respiratory distress.   Musculoskeletal:   Edema appears less than in the past.   Lymphadenopathy:     He has no cervical adenopathy.   Neurological: He is alert.   Psychiatric: He has a normal mood and affect.   Nursing note and vitals reviewed.        LABS:    Chest x-ray was ordered and showed atelectasis versus infiltrate.  CBC is pending.    ASSESSMENT:      ICD-10-CM    1. Hypoxemia R09.02    2. Dementia with behavioral disturbance, unspecified dementia type F03.91        PLAN:    At the time of the visit, I tried to telephone his wife but was unsuccessful.  I left a brief message.  The concerns I wanted to express were in regards to the history of hypercoagulable state with the factor V Leiden heterozygous state and the potential risk of pulmonary embolus as a result that could cause hypoxemia.  It is my assessment that transferred to hospital would not be in his best interest since it would be disruptive of his routines and could create more emotional distress for him in regards to his dementia and behavioral problems.  Upon review of his POLST from the last 5 years, the wish was comfort measures.  At the time of the visit I did not have the results of the chest x-ray but later the report was called to me and Levaquin 250 mg 1 daily for 10 days was started.      Electronically signed by: Francisco Scruggs MD

## 2021-06-19 NOTE — PROGRESS NOTES
[facility]    DATE: 2018    NAME:  Ezekiel Chi             :  1925    MRN: [id number]  CODE STATUS:  DNR    VISIT TYPE: ACUTE  FACILITY: [facility] [[id number]]       CHIEF COMPLAIN/REASON FOR VISIT:  Chief Complaint   Patient presents with     Problem Visit     Agitation and Hypoxemia       HISTORY OF PRESENT ILLNESS: Ezekiel Chi is a 89 y.o. male being seen for follow up of increased agitation. His Risperdal dose was increased with much improvement.  His behaviors are calm and cooperative today.  A CXR was done secondary to hypoxemia and there weren't any acute findings.      No Known Allergies:     Current Outpatient Prescriptions   Medication Sig     acetaminophen (TYLENOL) 325 MG tablet Take 650 mg by mouth every 6 (six) hours as needed for pain (Don't exceed 4,000mg acetaminophen in 24 hours).      aspirin 81 MG tablet Take 81 mg by mouth daily. Don't crush or swallow.     bisacodyl (DULCOLAX) 10 mg suppository Insert 10 mg into the rectum every third day as needed.      dextromethorphan-guaiFENesin (ROBITUSSIN-DM)  mg/5 mL liquid Take 10 mL by mouth every 4 (four) hours.     furosemide (LASIX) 40 MG tablet Take 40 mg by mouth daily.     ipratropium-albuterol (DUO-NEB) 0.5-2.5 mg/3 mL nebulizer 3 mL 4 (four) times a day as needed. (starting 2/10)     loperamide (IMODIUM) 2 mg capsule Take 2 mg by mouth 4 (four) times a day as needed for diarrhea. Give 4 mg with first loose stool than 2 mg after each loose stool.  No more than 16 mg daily     menthol-zinc oxide (CALMOSEPTINE) 0.44-20.6 % Oint ointment Apply 1 application topically 3 (three) times a day as needed.     menthol-zinc oxide (CALMOSEPTINE) 0.44-20.6 % Oint ointment Apply 1 application topically 3 (three) times a day.     mirtazapine (REMERON) 15 MG tablet Take 1 tablet (15 mg total) by mouth at bedtime.     multivitamin (MULTIVITAMIN) per tablet Take 1 tablet by mouth daily.      nitroglycerin (NITROSTAT) 0.4 MG SL tablet Place 0.4 mg under the tongue every 5 (five) minutes as needed for chest pain (Dissolve under tongue every 5 min up to 3 doses for chest pain as needed. Don't chew or swallow.).      nutritional supplements Pack Take 1 packet by mouth 2 (two) times a day.     risperiDONE (RISPERDAL) 0.25 MG tablet Take 0.25 mg by mouth 2 (two) times a day. (noon and dinner)     risperiDONE (RISPERDAL) 0.5 MG tablet Take 0.5 mg by mouth 2 (two) times a day. (AM and HS)     senna-docusate (SENNOSIDES-DOCUSATE SODIUM) 8.6-50 mg tablet Take 1 tablet by mouth daily.      sodium fluoride (DENTAGEL) 1.1 % Gel dental gel Apply 1 application to teeth bedtime.         REVIEW OF SYSTEMS:    Currently, no fever, chills, or rigors. Does not have any visual or hearing problems. Denies any chest pain, headaches, palpitations, lightheadedness, dizziness, shortness of breath, or cough. Appetite is good. Denies any GERD symptoms. Denies any difficulty with swallowing, nausea, or vomiting.  Denies any abdominal pain, diarrhea or constipation. Denies any urinary symptoms. No insomnia. No active bleeding. No rash.       PHYSICAL EXAMINATION:  Vitals:    07/04/18 2044   BP: 117/75   Pulse: 86   Resp: 18   Temp: 97.7  F (36.5  C)   SpO2: 91%   Weight: (!) 229 lb 3.2 oz (104 kg)     GENERAL: Awake, Alert, oriented x3, not in any form of acute distress, answers questions appropriately, follows simple commands, conversant  HEENT: Head is normocephalic with normal hair distribution. No evidence of trauma. Ears: No acute purulent discharge. Eyes: Conjunctivae pink with no scleral jaundice. Nose: Normal mucosa and septum. NECK: Supple with no cervical or supraclavicular lymphadenopathy. Trachea is midline.   CHEST: No tenderness or deformity, no crepitus  LUNG: Clear to auscultation with good chest expansion. There are no crackles or wheezes, normal AP diameter.  BACK: No kyphosis of the thoracic spine. Symmetric, no  curvature, ROM normal, no CVA tenderness, no spinal tenderness   CVS: There is good S1  S2, there are no murmurs, rubs, gallops, or heaves, rhythm is regular,  2+ pulses symmetric in all extremities.  ABDOMEN: Globular and soft, nontender to palpation, non distended, no masses, no organomegaly, good bowel sounds, no rebound or guarding, no peritoneal signs.   EXTREMITIES: Atraumatic. Full range of motion on both upper and lower extremities, there is no tenderness to palpation, no pedal edema, no cyanosis or clubbing, no calf tenderness.  Pulses equal in all extremities, normal cap refill, no joint swelling.  SKIN: Warm and dry, no erythema noted.  Skin color, texture, no rashes or lesions.  NEUROLOGICAL: The patient is oriented to person, place and time. Strength and sensation are grossly intact. Face is symmetric.    LABS:    Lab Results   Component Value Date    WBC 8.9 07/02/2018    HGB 14.9 07/02/2018    HCT 44.1 07/02/2018    MCV 86 07/02/2018     07/02/2018       Results for orders placed or performed in visit on 07/02/18   Basic Metabolic Panel   Result Value Ref Range    Sodium 137 136 - 145 mmol/L    Potassium 4.7 3.5 - 5.0 mmol/L    Chloride 102 98 - 107 mmol/L    CO2 30 22 - 31 mmol/L    Anion Gap, Calculation 5 5 - 18 mmol/L    Glucose 94 70 - 125 mg/dL    Calcium 9.2 8.5 - 10.5 mg/dL    BUN 16 8 - 28 mg/dL    Creatinine 0.80 0.70 - 1.30 mg/dL    GFR MDRD Af Amer >60 >60 mL/min/1.73m2    GFR MDRD Non Af Amer >60 >60 mL/min/1.73m2     Vitamin D, Total (25-Hydroxy)   Date Value Ref Range Status   06/12/2017 71.5 30.0 - 80.0 ng/mL Final     Lab Results   Component Value Date    [id number] 394 07/02/2018       ASSESSMENT/PLAN:    1. Dementia with behavioral disturbance, unspecified dementia type - Increased Risperdal by  Adding a prn dose and continued current dosing of Risperdal   2. Depression with anxiety -Mood has improved with recent change in Risperdal.  Continue Remeron   3. Hypoxemia - CXR  revealed no acute findings   4. Essential hypertension with goal blood pressure less than 140/90 - Blood pressures are within target range, will continue Lasix           Electronically signed by:  Tee Nguyen CNP    35 minutes spent of which greater than 50% was face to face communication with the patient about above plan of care

## 2021-06-19 NOTE — PROGRESS NOTES
[facility]    DATE: 7/10/2018    NAME:  Ezekiel Chi             :  1925  MRN: [id number]    CODE STATUS:  DNR  VISIT TYPE: REVIEW OF MULTIPLE CHRONIC MEDICAL CONDITIONS  FACILITY:  [facility] [[id number]]             CHIEF COMPLAIN/REASON FOR VISIT:   Chief Complaint   Patient presents with     Review Of Multiple Medical Conditions     HISTORY OF PRESENT ILLNESS: Ezekiel Chi is a 89 y.o. male is being seen today in the long term care facility for follow up and management of multiple chronic medical conditions.   He has Alzheimer's Dementia with psychosis.  He is on Risperdal 0.25 mg two times a day (5 am and 5 pm) and 0.5 mg two times a day (every morning and every evening).     Today, patient was seen in his room.  Staff has no concerns at this time.    Past Medical History:   Diagnosis Date     Abnormal posture      Adult failure to thrive      Alzheimer Disease Late Onset     Created by Conversion      Anxiety      Arthritis      Cardiac dysrhythmia, unspecified      Coronary artery disease      Coronary Artery Disease     Created by Conversion      Coronary atherosclerosis of unspecified type of vessel, native or graft      Dementia of the Alzheimer's type 2014     Depression with anxiety 2014     Difficulty in walking      Encephalopathy acute 2014     Esophageal reflux     Created by Conversion      Factor V Leiden Mutation     Created by Conversion [facility] Annotation: Oct 28 2008  2:00PM - Corey Moser: heterozygous      Fatigue     Created by Conversion      Fever 2014     Hyperlipidemia     Created by Conversion      Major Depression, Single Episode     Created by Conversion      Mood disorder in conditions classified elsewhere 2014     Muscle weakness (generalized)      Muscle Weakness Generalized     Created by Conversion      Myocardial infarction      Osteoarthritis of knee      Osteoarthritis Of The Knee     Created by  Conversion      Other protein-calorie malnutrition      Pressure ulcer, heel      PVD (peripheral vascular disease) (H)      Right Bundle Branch Block With Left Anterior Fascicular Block     Created by Conversion      Sebaceous cyst     Created by Conversion      Sleep Apnea     Created by Conversion      Sleep apnea      Urinary Frequency More Than Twice At Night (Nocturia)     Created by Conversion      Past Surgical History:   Procedure Laterality Date     CARDIAC SURGERY       Social History     Social History     Marital status:      Spouse name: N/A     Number of children: N/A     Years of education: N/A     Occupational History     Not on file.     Social History Main Topics     Smoking status: Former Smoker     Quit date: 7/28/1960     Smokeless tobacco: Never Used     Alcohol use No     Drug use: No     Sexual activity: No     Other Topics Concern     Not on file     Social History Narrative     No narrative on file       ALLERGY: No Known Allergies    DIET: Regular    MEDICATIONS:     Current Outpatient Prescriptions   Medication Sig     acetaminophen (TYLENOL) 325 MG tablet Take 650 mg by mouth every 6 (six) hours as needed for pain (Don't exceed 4,000mg acetaminophen in 24 hours).      aspirin 81 MG tablet Take 81 mg by mouth daily. Don't crush or swallow.     bisacodyl (DULCOLAX) 10 mg suppository Insert 10 mg into the rectum every third day as needed.      dextromethorphan-guaiFENesin (ROBITUSSIN-DM)  mg/5 mL liquid Take 10 mL by mouth every 4 (four) hours.     furosemide (LASIX) 40 MG tablet Take 40 mg by mouth daily.     ipratropium-albuterol (DUO-NEB) 0.5-2.5 mg/3 mL nebulizer 3 mL 4 (four) times a day as needed. (starting 2/10)     loperamide (IMODIUM) 2 mg capsule Take 2 mg by mouth 4 (four) times a day as needed for diarrhea. Give 4 mg with first loose stool than 2 mg after each loose stool.  No more than 16 mg daily     menthol-zinc oxide (CALMOSEPTINE) 0.44-20.6 % Oint ointment  Apply 1 application topically 3 (three) times a day as needed.     menthol-zinc oxide (CALMOSEPTINE) 0.44-20.6 % Oint ointment Apply 1 application topically 3 (three) times a day.     mirtazapine (REMERON) 15 MG tablet Take 1 tablet (15 mg total) by mouth at bedtime.     multivitamin (MULTIVITAMIN) per tablet Take 1 tablet by mouth daily.     nitroglycerin (NITROSTAT) 0.4 MG SL tablet Place 0.4 mg under the tongue every 5 (five) minutes as needed for chest pain (Dissolve under tongue every 5 min up to 3 doses for chest pain as needed. Don't chew or swallow.).      nutritional supplements Pack Take 1 packet by mouth 2 (two) times a day.     risperiDONE (RISPERDAL) 0.25 MG tablet Take 0.25 mg by mouth 2 (two) times a day. (noon and dinner)     risperiDONE (RISPERDAL) 0.5 MG tablet Take 0.5 mg by mouth 2 (two) times a day. (AM and HS)     senna-docusate (SENNOSIDES-DOCUSATE SODIUM) 8.6-50 mg tablet Take 1 tablet by mouth daily.      sodium fluoride (DENTAGEL) 1.1 % Gel dental gel Apply 1 application to teeth bedtime.     REVIEW OF SYSTEMS    Currently has no fever, chills, or rigors.  He does not have any visual problems but is hard of hearing. He denies any chest pain, headaches, palpitations, lightheadedness, dizziness,  shortness of breath, or cough.  His appetite is good, he denies any GERD symptoms, he denies any difficulty with swallowing, nausea, or vomiting.  He denies any abdominal pain, diarrhea or constipation. He denies any urinary symptoms. No insomnia.  No active bleeding.  No rash.  He does have chronic bilateral lower extremity edema.    PHYSICAL EXAMINATION:    Vitals:    07/12/18 1743   BP: 128/64   Pulse: 78   Resp: 16   Temp: 97.5  F (36.4  C)   SpO2: 90%   Weight: (!) 229 lb (103.9 kg)     HEENT:Normocephalic/atraumatic Conjunctivae without erythema nares are clear of any drainage.  Neck: No adenopathy JVD thyromegaly  Resp: Clear No rhonchi wheezing rales  Cardio: Regular rate and rhythm 2/6  murmur appreciated  Abdomen: Soft nontender no masses distention bowel sounds are present.  Extremities: 1+ lower extremity edema fair peripheral pulses  Skin: Skin intact No noted skin issues  : N/A  Musculoskeletal: Age-related degenerative joint disease  Psych: Alert oriented to self only    LABS:      Lab Results   Component Value Date    WBC 8.9 07/02/2018    HGB 14.9 07/02/2018    HCT 44.1 07/02/2018    MCV 86 07/02/2018     07/02/2018     Results for orders placed or performed in visit on 07/02/18   Basic Metabolic Panel   Result Value Ref Range    Sodium 137 136 - 145 mmol/L    Potassium 4.7 3.5 - 5.0 mmol/L    Chloride 102 98 - 107 mmol/L    CO2 30 22 - 31 mmol/L    Anion Gap, Calculation 5 5 - 18 mmol/L    Glucose 94 70 - 125 mg/dL    Calcium 9.2 8.5 - 10.5 mg/dL    BUN 16 8 - 28 mg/dL    Creatinine 0.80 0.70 - 1.30 mg/dL    GFR MDRD Af Amer >60 >60 mL/min/1.73m2    GFR MDRD Non Af Amer >60 >60 mL/min/1.73m2       Lab Results   Component Value Date    [id number] 394 07/02/2018     Vitamin D, Total (25-Hydroxy)   Date Value Ref Range Status   06/12/2017 71.5 30.0 - 80.0 ng/mL Final     Lab Results   Component Value Date    TSH 2.38 07/02/2018     Lab Results   Component Value Date    ALBUMIN 3.1 (L) 07/02/2018       ASSESSMENT/PLAN:    1. Dementia with behavioral disturbance, unspecified dementia type  Neurologically stable, will continue Mirtazapine and Risperidone.  Last TSH 2.38 and Vitamin B12 394   2. Coronary Artery Disease - No overt signs or symptoms of decompensation, will continue Nitro, ASA, and Lasix   3. Osteoarthritis Of The Knee - Stable on Tylenol   4. Depression with anxiety - Mood stable with Risperdal and Mirtazapine   5. Bilateral lower extremity edema - Continue compression wraps and Lasix               CARE PLAN: The care plan has been reviewed and discussed with patient and nursing staff. No changes made at this time. We will continue to monitor.        Electronically signed  by: Tee Nguyen, CNP

## 2021-06-20 NOTE — PROGRESS NOTES
[facility]    DATE: 2018    NAME:  Ezekiel Chi             :  1925  MRN: [id number]    CODE STATUS:  DNR  VISIT TYPE: REVIEW OF MULTIPLE CHRONIC MEDICAL CONDITIONS  FACILITY:  [facility] [[id number]]             CHIEF COMPLAIN/REASON FOR VISIT:   Chief Complaint   Patient presents with     Review Of Multiple Medical Conditions     HISTORY OF PRESENT ILLNESS: Ezekiel Chi is a 89 y.o. male is being seen today in the long term care facility for follow up and management of multiple chronic medical conditions.   He has Alzheimer's Dementia with psychosis.  He is on Risperdal 0.25 mg two times a day (5 am and 5 pm) and 0.5 mg two times a day (every morning and every evening).     Today, patient was seen in his room.  Last month patient was treated with Levaquin for pneumonia.  Presently with no cough and afebrile.  No other respiratory symptomology.  No pain.  Appetite is good. No insomnia.    Past Medical History:   Diagnosis Date     Abnormal posture      Adult failure to thrive      Alzheimer Disease Late Onset     Created by Conversion      Anxiety      Arthritis      Cardiac dysrhythmia, unspecified      Coronary artery disease      Coronary Artery Disease     Created by Conversion      Coronary atherosclerosis of unspecified type of vessel, native or graft      Dementia of the Alzheimer's type 2014     Depression with anxiety 2014     Difficulty in walking      Encephalopathy acute 2014     Esophageal reflux     Created by Conversion      Factor V Leiden Mutation     Created by Conversion [facility] Annotation: Oct 28 2008  2:00PM - Corey Moser: heterozygous      Fatigue     Created by Conversion      Fever 2014     Hyperlipidemia     Created by Conversion      Major Depression, Single Episode     Created by Conversion      Mood disorder in conditions classified elsewhere 2014     Muscle weakness (generalized)      Muscle Weakness  Generalized     Created by Conversion      Myocardial infarction      Osteoarthritis of knee      Osteoarthritis Of The Knee     Created by Conversion      Other protein-calorie malnutrition      Pressure ulcer, heel      PVD (peripheral vascular disease) (H)      Right Bundle Branch Block With Left Anterior Fascicular Block     Created by Conversion      Sebaceous cyst     Created by Conversion      Sleep Apnea     Created by Conversion      Sleep apnea      Urinary Frequency More Than Twice At Night (Nocturia)     Created by Conversion      Past Surgical History:   Procedure Laterality Date     CARDIAC SURGERY       Social History     Social History     Marital status:      Spouse name: N/A     Number of children: N/A     Years of education: N/A     Occupational History     Not on file.     Social History Main Topics     Smoking status: Former Smoker     Quit date: 7/28/1960     Smokeless tobacco: Never Used     Alcohol use No     Drug use: No     Sexual activity: No     Other Topics Concern     Not on file     Social History Narrative     No narrative on file       ALLERGY: No Known Allergies    DIET: Regular    MEDICATIONS:     Current Outpatient Prescriptions   Medication Sig     acetaminophen (TYLENOL) 325 MG tablet Take 650 mg by mouth every 6 (six) hours as needed for pain (Don't exceed 4,000mg acetaminophen in 24 hours).      aspirin 81 MG tablet Take 81 mg by mouth daily. Don't crush or swallow.     bisacodyl (DULCOLAX) 10 mg suppository Insert 10 mg into the rectum every third day as needed.      dextromethorphan-guaiFENesin (ROBITUSSIN-DM)  mg/5 mL liquid Take 10 mL by mouth every 4 (four) hours.     furosemide (LASIX) 40 MG tablet Take 40 mg by mouth daily.     ipratropium-albuterol (DUO-NEB) 0.5-2.5 mg/3 mL nebulizer 3 mL 4 (four) times a day as needed. (starting 2/10)     loperamide (IMODIUM) 2 mg capsule Take 2 mg by mouth 4 (four) times a day as needed for diarrhea. Give 4 mg with first  loose stool than 2 mg after each loose stool.  No more than 16 mg daily     menthol-zinc oxide (CALMOSEPTINE) 0.44-20.6 % Oint ointment Apply 1 application topically 3 (three) times a day as needed.     menthol-zinc oxide (CALMOSEPTINE) 0.44-20.6 % Oint ointment Apply 1 application topically 3 (three) times a day.     mirtazapine (REMERON) 15 MG tablet Take 1 tablet (15 mg total) by mouth at bedtime.     multivitamin (MULTIVITAMIN) per tablet Take 1 tablet by mouth daily.     nitroglycerin (NITROSTAT) 0.4 MG SL tablet Place 0.4 mg under the tongue every 5 (five) minutes as needed for chest pain (Dissolve under tongue every 5 min up to 3 doses for chest pain as needed. Don't chew or swallow.).      nutritional supplements Pack Take 1 packet by mouth 2 (two) times a day.     risperiDONE (RISPERDAL) 0.25 MG tablet Take 0.25 mg by mouth 2 (two) times a day. (noon and dinner)     risperiDONE (RISPERDAL) 0.5 MG tablet Take 1 tablet (0.5 mg total) by mouth 2 (two) times a day. (AM and HS)     senna-docusate (SENNOSIDES-DOCUSATE SODIUM) 8.6-50 mg tablet Take 1 tablet by mouth daily.      sodium fluoride (DENTAGEL) 1.1 % Gel dental gel Apply 1 application to teeth bedtime.     REVIEW OF SYSTEMS    Currently has no fever, chills, or rigors.  He does not have any visual problems but is hard of hearing. He denies any chest pain, headaches, palpitations, lightheadedness, dizziness,  shortness of breath, or cough.  His appetite is good, he denies any GERD symptoms, he denies any difficulty with swallowing, nausea, or vomiting.  He denies any abdominal pain, diarrhea or constipation. He denies any urinary symptoms. No insomnia.  No active bleeding.  No rash.  He does have chronic bilateral lower extremity edema.    PHYSICAL EXAMINATION:    Vitals:    09/03/18 2207   BP: 135/58   Pulse: 67   Resp: 19   Temp: 98.2  F (36.8  C)   SpO2: 93%   Weight: (!) 228 lb 4.8 oz (103.6 kg)     HEENT:Normocephalic/atraumatic Conjunctivae without  erythema nares are clear of any drainage.  Neck: No adenopathy JVD thyromegaly  Resp: Clear No rhonchi wheezing rales  Cardio: Regular rate and rhythm 2/6 murmur appreciated  Abdomen: Soft nontender no masses distention bowel sounds are present.  Extremities: 1+ lower extremity edema fair peripheral pulses  Skin: Skin intact No noted skin issues  : N/A  Musculoskeletal: Age-related degenerative joint disease  Psych: Alert oriented to self only    LABS:      Lab Results   Component Value Date    WBC 9.3 07/13/2018    HGB 14.1 07/13/2018    HCT 43.3 07/13/2018    MCV 90 07/13/2018     07/13/2018     Results for orders placed or performed in visit on 07/13/18   Basic Metabolic Panel   Result Value Ref Range    Sodium 138 136 - 145 mmol/L    Potassium 4.0 3.5 - 5.0 mmol/L    Chloride 100 98 - 107 mmol/L    CO2 30 22 - 31 mmol/L    Anion Gap, Calculation 8 5 - 18 mmol/L    Glucose 98 70 - 125 mg/dL    Calcium 8.9 8.5 - 10.5 mg/dL    BUN 18 8 - 28 mg/dL    Creatinine 0.83 0.70 - 1.30 mg/dL    GFR MDRD Af Amer >60 >60 mL/min/1.73m2    GFR MDRD Non Af Amer >60 >60 mL/min/1.73m2       Lab Results   Component Value Date    [id number] 394 07/02/2018     Vitamin D, Total (25-Hydroxy)   Date Value Ref Range Status   06/12/2017 71.5 30.0 - 80.0 ng/mL Final     Lab Results   Component Value Date    TSH 2.38 07/02/2018     Lab Results   Component Value Date    ALBUMIN 3.1 (L) 07/02/2018       ASSESSMENT/PLAN:    1. Pneumonia - Resolved, completed a 10 day course of Levaquin    2. Alzheimer's dementia without behavioral disturbance, unspecified timing of dementia onset - Neurologically stable, will continue Mirtazapine and Risperidone.  Last TSH 2.38 and Vitamin B12 394   3. Coronary Artery Disease - No overt signs or symptoms of decompensation, will continue Nitro, ASA, and Lasix   4. Essential hypertension with goal blood pressure less than 140/90 - Blood pressure are within target range, will continue Lasix   5. Muscle  Weakness Generalized - Progressive                     CARE PLAN: The care plan has been reviewed and discussed with patient and nursing staff. No changes made at this time. We will continue to monitor.        Electronically signed by: Tee Nguyen CNP

## 2021-06-20 NOTE — PROGRESS NOTES
[facility]    Facility:   [facility] [[id number]]   Code Status: DNR/DNI  PCP: Tee Nguyen CNP   Phone: [phone]   Fax: [phone]      CHIEF COMPLAINT/REASON FOR VISIT:  Chief Complaint   Patient presents with     Discharge Summary       HISTORY COURSE:  Ezekiel is a 89-year-old long-term resident who will now be discharging to another facility.  One of the primary reasons for this visit today is a face-to-face encounter regarding needs for continuous home oxygen therapy, a semi-electric hospital bed, and a broad wheelchair.  He has multiple medical problems which include factor V Leiden mutation with risk of pulmonary embolus, for which he has not been able to be on long-term anticoagulation with his frequent falls.  He periodically has hypoxemia which is likely related to this hypercoagulable state which results in small emboli creating the hypoxemia.  For this reason he requires continuous oxygen.  He also has coronary artery disease and due to his advanced dementia is not able to always communicate his medical status and for this reason continuous oxygen is required.  He requires positioning of his body in ways not feasible with an ordinary bed due to his multiple underlying medical problems as mentioned so that he could permit transfers to chair and wheelchair and prevent pressure ulcers of which he has had numerous episodes of the coccyx and of the heel in the past.  In regards to other medical conditions, he does have sleep apnea, GERD, generalized muscle weakness and fatigue, osteoarthritis of the knee, major depression, and Alzheimer's disease with behavioral disturbance.  He has not required his DuoNeb since February and so this will be discontinued today.     Home oxygen therapy is required due to desaturation on room air at rest, and home oxygen therapy will benefit the patient's condition.  He is mobile and requires portability.  The patient has tried  "medications with limited success and oxygen is still required.  He requires stationary and portable system with a flow rate of 2 L/min delivered via nasal cannula to be used continuously with activity and when sleeping due to diagnoses of chronic respiratory failure secondary to hypoxia, J96.11 and factor V Leiden heterozygous state and the potential risk of pulmonary embolus.  The expected duration of need for this item his lifetime.  The diagnosis code is D68.5.    A hospital bed is required because the patient requires positioning of the body in ways not feasible with an ordinary bed to alleviate pain and requires a bed height different than a fixed height hospital bed to med per minute transfers to chair, wheelchair, and transferring in general due to the diagnosis of chronic respiratory failure due to hypoxia, J96.11 and factor V Leiden heterozygous state and the potential risk of pulmonary embolus, D68.5, that will last a lifetime.    A wheelchair is required due to his diagnosis of chronic respiratory failure due to hypoxia, J96.11 and factor V Leiden heterozygous state and the potential risk of pulmonary embolus, D68.5.  My patient has the following limitations of mobility due to these diagnoses, with the inability to participate in ADLs and cannot walk with an assistive device such as a cane or walker.  He will be in a facility that is wheelchair accessible and has adequate space between rooms for maneuvering the wheelchair.  He will use this wheelchair daily and it will improve participation in ADLs.  I recommend broad wheelchair for increased accessibility.      Review of Systems    Vitals:    09/17/18 0702   BP: (!) 132/96   Pulse: 74   Resp: 16   Temp: 98.6  F (37  C)   SpO2: 95%   Weight: (!) 225 lb (102.1 kg)   Height: 5' 11\" (1.803 m)       Physical Exam   Constitutional: No distress.   Eyes: Right eye exhibits no discharge. Left eye exhibits no discharge.   Neck: No JVD present.   Cardiovascular: " Normal heart sounds.    Pulmonary/Chest: Breath sounds normal. No respiratory distress.   Abdominal: Bowel sounds are normal. There is no tenderness.   Musculoskeletal:   Protective pads are present for both heels and feet   Neurological: He is alert.   Psychiatric: He has a normal mood and affect.   Nursing note and vitals reviewed.      MEDICATION LIST:  Current Outpatient Prescriptions   Medication Sig     acetaminophen (TYLENOL) 325 MG tablet Take 650 mg by mouth every 6 (six) hours as needed for pain (Don't exceed 4,000mg acetaminophen in 24 hours).      aspirin 81 MG tablet Take 81 mg by mouth daily. Don't crush or swallow.     bisacodyl (DULCOLAX) 10 mg suppository Insert 10 mg into the rectum every third day as needed.      dextromethorphan-guaiFENesin (ROBITUSSIN-DM)  mg/5 mL liquid Take 10 mL by mouth every 4 (four) hours.     furosemide (LASIX) 40 MG tablet Take 40 mg by mouth daily.     loperamide (IMODIUM) 2 mg capsule Take 2 mg by mouth 4 (four) times a day as needed for diarrhea. Give 4 mg with first loose stool than 2 mg after each loose stool.  No more than 16 mg daily     menthol-zinc oxide (CALMOSEPTINE) 0.44-20.6 % Oint ointment Apply 1 application topically 3 (three) times a day as needed.     menthol-zinc oxide (CALMOSEPTINE) 0.44-20.6 % Oint ointment Apply 1 application topically 3 (three) times a day.     mirtazapine (REMERON) 15 MG tablet Take 1 tablet (15 mg total) by mouth at bedtime.     multivitamin (MULTIVITAMIN) per tablet Take 1 tablet by mouth daily.     nitroglycerin (NITROSTAT) 0.4 MG SL tablet Place 0.4 mg under the tongue every 5 (five) minutes as needed for chest pain (Dissolve under tongue every 5 min up to 3 doses for chest pain as needed. Don't chew or swallow.).      nutritional supplements Pack Take 1 packet by mouth 2 (two) times a day.     risperiDONE (RISPERDAL) 0.25 MG tablet Take 0.25 mg by mouth 2 (two) times a day. (noon and dinner)     risperiDONE (RISPERDAL)  0.5 MG tablet Take 1 tablet (0.5 mg total) by mouth 2 (two) times a day. (AM and HS)     senna-docusate (SENNOSIDES-DOCUSATE SODIUM) 8.6-50 mg tablet Take 1 tablet by mouth daily.      sodium fluoride (DENTAGEL) 1.1 % Gel dental gel Apply 1 application to teeth bedtime.       DISCHARGE DIAGNOSIS:    ICD-10-CM    1. Factor V Leiden Mutation D68.59    2. Hypoxemia R09.02    3. Late onset Alzheimer's disease with behavioral disturbance G30.1     F02.81    4. Atherosclerosis of coronary artery of native heart, angina presence unspecified, unspecified vessel or lesion type I25.10    5. Sleep apnea, unspecified type G47.30    6. Mood disorder in conditions classified elsewhere F06.30    7. Muscle Weakness Generalized M62.81    8. Frequent falls R29.6    9.      Chronic respiratory failure secondary to hypoxia  J96.11    MEDICAL EQUIPMENT NEEDS:  As outlined above he needs home oxygen therapy which is continuous with both a stationary and portable system.  He needs a semi-electric hospital bed.  He needs a broad wheelchair.    DISCHARGE PLAN/FACE TO FACE:  I certify that services are/were furnished while this patient was under the care of a physician and that a physician or an allowed non-physician practitioner (NPP), had a face-to-face encounter that meets the physician face-to-face encounter requirements. The encounter was in whole, or in part, related to the primary reason for home health. The patient is confined to his/her home and needs intermittent skilled nursing, physical therapy, speech-language pathology, or the continued need for occupational therapy. A plan of care has been established by a physician and is periodically reviewed by a physician.  Date of Face-to-Face Encounter: 9/20/18.    I certify that, based on my findings, the following services are medically necessary home health services: Needs all of the skilled nursing facility requirements of a long-term care facility.    My clinical findings support  the need for the above skilled services because: (Please write a brief narrative summary that describes what the RN, PT, SLP, or other services will be doing in the home. A list of diagnoses in this section does not meet the CMS requirements.)  Skilled nursing is required to monitor his multiple medical problems and administer medications in a timely fashion.    This patient is homebound because: (Please write a brief narrative summary describing the functional limitations as to why this patient is homebound and specifically what makes this patient homebound.)  He has profound weakness and gait instability and also does not have judgment due to his Alzheimer's dementia.    The patient is, or has been, under my care and I have initiated the establishment of the plan of care. This patient will be followed by a physician who will periodically review the plan of care.    Schedule follow up visit with primary care provider within 7 days to reestablish care.    Electronically signed by: Francisco Scruggs MD

## 2021-06-20 NOTE — PROGRESS NOTES
[facility]    DATE: 2018    NAME:  Ezekiel Chi             :  1925  MRN: [id number]    CODE STATUS:  DNR  VISIT TYPE: REVIEW OF MULTIPLE CHRONIC MEDICAL CONDITIONS  FACILITY:  [facility] [[id number]]             CHIEF COMPLAIN/REASON FOR VISIT:   Chief Complaint   Patient presents with     Review Of Multiple Medical Conditions     HISTORY OF PRESENT ILLNESS: Ezekiel Chi is a 89 y.o. male is being seen today in the long term care facility for follow up and management of multiple chronic medical conditions.   He has Alzheimer's Dementia with psychosis.  He is on Risperdal 0.25 mg two times a day (5 am and 5 pm) and 0.5 mg two times a day (every morning and every evening).     Today, patient was seen in his room.   Overall, health is stable.  Nursing staff has no concerns at this time.     Past Medical History:   Diagnosis Date     Abnormal posture      Adult failure to thrive      Alzheimer Disease Late Onset     Created by Conversion      Anxiety      Arthritis      Cardiac dysrhythmia, unspecified      Coronary artery disease      Coronary Artery Disease     Created by Conversion      Coronary atherosclerosis of unspecified type of vessel, native or graft      Dementia of the Alzheimer's type 2014     Depression with anxiety 2014     Difficulty in walking      Encephalopathy acute 2014     Esophageal reflux     Created by Conversion      Factor V Leiden Mutation     Created by Conversion [facility] Annotation: Oct 28 2008  2:00PM - Corey Msoer: heterozygous      Fatigue     Created by Conversion      Fever 2014     Hyperlipidemia     Created by Conversion      Major Depression, Single Episode     Created by Conversion      Mood disorder in conditions classified elsewhere 2014     Muscle weakness (generalized)      Muscle Weakness Generalized     Created by Conversion      Myocardial infarction      Osteoarthritis of knee       Osteoarthritis Of The Knee     Created by Conversion      Other protein-calorie malnutrition      Pressure ulcer, heel      PVD (peripheral vascular disease) (H)      Right Bundle Branch Block With Left Anterior Fascicular Block     Created by Conversion      Sebaceous cyst     Created by Conversion      Sleep Apnea     Created by Conversion      Sleep apnea      Urinary Frequency More Than Twice At Night (Nocturia)     Created by Conversion      Past Surgical History:   Procedure Laterality Date     CARDIAC SURGERY       Social History     Social History     Marital status:      Spouse name: N/A     Number of children: N/A     Years of education: N/A     Occupational History     Not on file.     Social History Main Topics     Smoking status: Former Smoker     Quit date: 7/28/1960     Smokeless tobacco: Never Used     Alcohol use No     Drug use: No     Sexual activity: No     Other Topics Concern     Not on file     Social History Narrative     No narrative on file       ALLERGY: No Known Allergies    DIET: Regular    MEDICATIONS:     Current Outpatient Prescriptions   Medication Sig     acetaminophen (TYLENOL) 325 MG tablet Take 650 mg by mouth every 6 (six) hours as needed for pain (Don't exceed 4,000mg acetaminophen in 24 hours).      aspirin 81 MG tablet Take 81 mg by mouth daily. Don't crush or swallow.     bisacodyl (DULCOLAX) 10 mg suppository Insert 10 mg into the rectum every third day as needed.      dextromethorphan-guaiFENesin (ROBITUSSIN-DM)  mg/5 mL liquid Take 10 mL by mouth every 4 (four) hours.     furosemide (LASIX) 40 MG tablet Take 40 mg by mouth daily.     ipratropium-albuterol (DUO-NEB) 0.5-2.5 mg/3 mL nebulizer 3 mL 4 (four) times a day as needed. (starting 2/10)     loperamide (IMODIUM) 2 mg capsule Take 2 mg by mouth 4 (four) times a day as needed for diarrhea. Give 4 mg with first loose stool than 2 mg after each loose stool.  No more than 16 mg daily     menthol-zinc oxide  (CALMOSEPTINE) 0.44-20.6 % Oint ointment Apply 1 application topically 3 (three) times a day as needed.     menthol-zinc oxide (CALMOSEPTINE) 0.44-20.6 % Oint ointment Apply 1 application topically 3 (three) times a day.     mirtazapine (REMERON) 15 MG tablet Take 1 tablet (15 mg total) by mouth at bedtime.     multivitamin (MULTIVITAMIN) per tablet Take 1 tablet by mouth daily.     nitroglycerin (NITROSTAT) 0.4 MG SL tablet Place 0.4 mg under the tongue every 5 (five) minutes as needed for chest pain (Dissolve under tongue every 5 min up to 3 doses for chest pain as needed. Don't chew or swallow.).      nutritional supplements Pack Take 1 packet by mouth 2 (two) times a day.     risperiDONE (RISPERDAL) 0.25 MG tablet Take 0.25 mg by mouth 2 (two) times a day. (noon and dinner)     risperiDONE (RISPERDAL) 0.5 MG tablet Take 1 tablet (0.5 mg total) by mouth 2 (two) times a day. (AM and HS)     senna-docusate (SENNOSIDES-DOCUSATE SODIUM) 8.6-50 mg tablet Take 1 tablet by mouth daily.      sodium fluoride (DENTAGEL) 1.1 % Gel dental gel Apply 1 application to teeth bedtime.     REVIEW OF SYSTEMS    Currently has no fever, chills, or rigors.  He does not have any visual problems but is hard of hearing. He denies any chest pain, headaches, palpitations, lightheadedness, dizziness,  shortness of breath, or cough.  His appetite is good, he denies any GERD symptoms, he denies any difficulty with swallowing, nausea, or vomiting.  He denies any abdominal pain, diarrhea or constipation. He denies any urinary symptoms. No insomnia.  No active bleeding.  No rash.  He does have chronic bilateral lower extremity edema.    PHYSICAL EXAMINATION:    Vitals:    09/11/18 1045   BP: 138/76   Pulse: 74   Resp: 16   Temp: 97.1  F (36.2  C)   SpO2: 93%   Weight: (!) 229 lb 6.4 oz (104.1 kg)     HEENT:Normocephalic/atraumatic Conjunctivae without erythema nares are clear of any drainage.  Neck: No adenopathy JVD thyromegaly  Resp: Clear No  rhonchi wheezing rales  Cardio: Regular rate and rhythm 2/6 murmur appreciated  Abdomen: Soft nontender no masses distention bowel sounds are present.  Extremities: 1+ lower extremity edema fair peripheral pulses  Skin: Skin intact No noted skin issues  : N/A  Musculoskeletal: Age-related degenerative joint disease  Psych: Alert oriented to self only    LABS:      Lab Results   Component Value Date    WBC 9.3 07/13/2018    HGB 14.1 07/13/2018    HCT 43.3 07/13/2018    MCV 90 07/13/2018     07/13/2018     Results for orders placed or performed in visit on 07/13/18   Basic Metabolic Panel   Result Value Ref Range    Sodium 138 136 - 145 mmol/L    Potassium 4.0 3.5 - 5.0 mmol/L    Chloride 100 98 - 107 mmol/L    CO2 30 22 - 31 mmol/L    Anion Gap, Calculation 8 5 - 18 mmol/L    Glucose 98 70 - 125 mg/dL    Calcium 8.9 8.5 - 10.5 mg/dL    BUN 18 8 - 28 mg/dL    Creatinine 0.83 0.70 - 1.30 mg/dL    GFR MDRD Af Amer >60 >60 mL/min/1.73m2    GFR MDRD Non Af Amer >60 >60 mL/min/1.73m2       Lab Results   Component Value Date    GTQKYZWL64 394 07/02/2018     Vitamin D, Total (25-Hydroxy)   Date Value Ref Range Status   06/12/2017 71.5 30.0 - 80.0 ng/mL Final     Lab Results   Component Value Date    TSH 2.38 07/02/2018     Lab Results   Component Value Date    ALBUMIN 3.1 (L) 07/02/2018       ASSESSMENT/PLAN:    1. Muscle Weakness Generalized - Progressive   2. Depression with anxiety - Mood stable with Risperdal and Mirtazapine   3. Alzheimer's dementia without behavioral disturbance, unspecified timing of dementia onset  - Neurologically stable, will continue Mirtazapine and Risperidone.  Last TSH 2.38 and Vitamin B12 394   4. Bilateral lower extremity edema - Continue Lasix and compression stockings   5. Coronary Artery Disease - No overt signs or symptoms of decompensation, will continue Nitro, ASA, and Lasix                           CARE PLAN: The care plan has been reviewed and discussed with patient and  nursing staff. No changes made at this time. We will continue to monitor.        Electronically signed by: Tee Nguyen CNP